# Patient Record
Sex: FEMALE | Race: WHITE | Employment: FULL TIME | URBAN - METROPOLITAN AREA
[De-identification: names, ages, dates, MRNs, and addresses within clinical notes are randomized per-mention and may not be internally consistent; named-entity substitution may affect disease eponyms.]

---

## 2017-01-26 ENCOUNTER — OFFICE VISIT (OUTPATIENT)
Dept: PRIMARY CARE CLINIC | Age: 27
End: 2017-01-26

## 2017-01-26 VITALS
WEIGHT: 159.4 LBS | TEMPERATURE: 98.2 F | HEART RATE: 78 BPM | RESPIRATION RATE: 14 BRPM | HEIGHT: 63 IN | SYSTOLIC BLOOD PRESSURE: 104 MMHG | DIASTOLIC BLOOD PRESSURE: 70 MMHG | BODY MASS INDEX: 28.24 KG/M2

## 2017-01-26 DIAGNOSIS — F90.1 ATTENTION-DEFICIT HYPERACTIVITY DISORDER, PREDOMINANTLY HYPERACTIVE TYPE: ICD-10-CM

## 2017-01-26 DIAGNOSIS — R05.9 COUGH: ICD-10-CM

## 2017-01-26 DIAGNOSIS — J06.9 ACUTE UPPER RESPIRATORY INFECTION: Primary | ICD-10-CM

## 2017-01-26 PROCEDURE — 99213 OFFICE O/P EST LOW 20 MIN: CPT | Performed by: FAMILY MEDICINE

## 2017-01-26 RX ORDER — DEXTROAMPHETAMINE SACCHARATE, AMPHETAMINE ASPARTATE MONOHYDRATE, DEXTROAMPHETAMINE SULFATE AND AMPHETAMINE SULFATE 5; 5; 5; 5 MG/1; MG/1; MG/1; MG/1
20 CAPSULE, EXTENDED RELEASE ORAL EVERY MORNING
Qty: 30 CAPSULE | Refills: 0 | Status: SHIPPED | OUTPATIENT
Start: 2017-03-26 | End: 2017-04-06 | Stop reason: SDUPTHER

## 2017-01-26 RX ORDER — GUAIFENESIN AND CODEINE PHOSPHATE 100; 10 MG/5ML; MG/5ML
SYRUP ORAL
Refills: 0 | COMMUNITY
Start: 2017-01-14 | End: 2017-02-23

## 2017-01-26 RX ORDER — DEXTROMETHORPHAN HYDROBROMIDE AND PROMETHAZINE HYDROCHLORIDE 15; 6.25 MG/5ML; MG/5ML
SYRUP ORAL
Refills: 0 | COMMUNITY
Start: 2017-01-02 | End: 2017-02-23

## 2017-01-26 RX ORDER — DEXTROAMPHETAMINE SACCHARATE, AMPHETAMINE ASPARTATE MONOHYDRATE, DEXTROAMPHETAMINE SULFATE AND AMPHETAMINE SULFATE 5; 5; 5; 5 MG/1; MG/1; MG/1; MG/1
20 CAPSULE, EXTENDED RELEASE ORAL EVERY MORNING
Qty: 30 CAPSULE | Refills: 0 | Status: SHIPPED | OUTPATIENT
Start: 2017-02-26 | End: 2017-02-23

## 2017-01-26 RX ORDER — DEXTROAMPHETAMINE SACCHARATE, AMPHETAMINE ASPARTATE MONOHYDRATE, DEXTROAMPHETAMINE SULFATE AND AMPHETAMINE SULFATE 5; 5; 5; 5 MG/1; MG/1; MG/1; MG/1
20 CAPSULE, EXTENDED RELEASE ORAL EVERY MORNING
Qty: 30 CAPSULE | Refills: 0 | Status: SHIPPED | OUTPATIENT
Start: 2017-01-26 | End: 2017-02-23

## 2017-01-26 ASSESSMENT — ENCOUNTER SYMPTOMS
ABDOMINAL PAIN: 0
SHORTNESS OF BREATH: 0

## 2017-02-23 ENCOUNTER — OFFICE VISIT (OUTPATIENT)
Dept: PRIMARY CARE CLINIC | Age: 27
End: 2017-02-23

## 2017-02-23 VITALS
HEIGHT: 63 IN | SYSTOLIC BLOOD PRESSURE: 110 MMHG | HEART RATE: 104 BPM | RESPIRATION RATE: 16 BRPM | WEIGHT: 159 LBS | BODY MASS INDEX: 28.17 KG/M2 | DIASTOLIC BLOOD PRESSURE: 72 MMHG | OXYGEN SATURATION: 98 % | TEMPERATURE: 97.1 F

## 2017-02-23 DIAGNOSIS — Z72.0 NICOTINE ABUSE: ICD-10-CM

## 2017-02-23 DIAGNOSIS — F90.0 ATTENTION DEFICIT HYPERACTIVITY DISORDER (ADHD), PREDOMINANTLY INATTENTIVE TYPE: Primary | ICD-10-CM

## 2017-02-23 PROCEDURE — 99214 OFFICE O/P EST MOD 30 MIN: CPT | Performed by: FAMILY MEDICINE

## 2017-02-23 RX ORDER — BUPROPION HYDROCHLORIDE 150 MG/1
150 TABLET, EXTENDED RELEASE ORAL 2 TIMES DAILY
Qty: 60 TABLET | Refills: 2 | Status: SHIPPED | OUTPATIENT
Start: 2017-02-23 | End: 2017-07-12

## 2017-03-04 ASSESSMENT — ENCOUNTER SYMPTOMS
SHORTNESS OF BREATH: 0
COUGH: 0

## 2017-03-28 DIAGNOSIS — F90.1 ATTENTION-DEFICIT HYPERACTIVITY DISORDER, PREDOMINANTLY HYPERACTIVE TYPE: ICD-10-CM

## 2017-03-28 RX ORDER — DEXTROAMPHETAMINE SACCHARATE, AMPHETAMINE ASPARTATE MONOHYDRATE, DEXTROAMPHETAMINE SULFATE AND AMPHETAMINE SULFATE 5; 5; 5; 5 MG/1; MG/1; MG/1; MG/1
20 CAPSULE, EXTENDED RELEASE ORAL EVERY MORNING
Qty: 30 CAPSULE | Refills: 0 | OUTPATIENT
Start: 2017-03-28

## 2017-04-06 ENCOUNTER — OFFICE VISIT (OUTPATIENT)
Dept: PRIMARY CARE CLINIC | Age: 27
End: 2017-04-06

## 2017-04-06 VITALS
WEIGHT: 160 LBS | TEMPERATURE: 98.5 F | SYSTOLIC BLOOD PRESSURE: 100 MMHG | HEART RATE: 91 BPM | RESPIRATION RATE: 14 BRPM | DIASTOLIC BLOOD PRESSURE: 80 MMHG | BODY MASS INDEX: 28.35 KG/M2 | HEIGHT: 63 IN

## 2017-04-06 DIAGNOSIS — Z72.0 NICOTINE ABUSE: ICD-10-CM

## 2017-04-06 DIAGNOSIS — S29.019A THORACIC MYOFASCIAL STRAIN, INITIAL ENCOUNTER: ICD-10-CM

## 2017-04-06 DIAGNOSIS — F90.0 ATTENTION DEFICIT HYPERACTIVITY DISORDER (ADHD), PREDOMINANTLY INATTENTIVE TYPE: Primary | ICD-10-CM

## 2017-04-06 DIAGNOSIS — F90.1 ATTENTION-DEFICIT HYPERACTIVITY DISORDER, PREDOMINANTLY HYPERACTIVE TYPE: ICD-10-CM

## 2017-04-06 PROCEDURE — 99213 OFFICE O/P EST LOW 20 MIN: CPT | Performed by: FAMILY MEDICINE

## 2017-04-06 RX ORDER — DEXTROAMPHETAMINE SACCHARATE, AMPHETAMINE ASPARTATE MONOHYDRATE, DEXTROAMPHETAMINE SULFATE AND AMPHETAMINE SULFATE 5; 5; 5; 5 MG/1; MG/1; MG/1; MG/1
20 CAPSULE, EXTENDED RELEASE ORAL EVERY MORNING
Qty: 30 CAPSULE | Refills: 0 | Status: SHIPPED | OUTPATIENT
Start: 2017-04-06 | End: 2017-05-26 | Stop reason: SDUPTHER

## 2017-04-06 RX ORDER — PREDNISONE 10 MG/1
TABLET ORAL
Qty: 20 TABLET | Refills: 0 | Status: SHIPPED | OUTPATIENT
Start: 2017-04-06 | End: 2017-04-20

## 2017-04-06 ASSESSMENT — ENCOUNTER SYMPTOMS: SHORTNESS OF BREATH: 0

## 2017-05-26 ENCOUNTER — OFFICE VISIT (OUTPATIENT)
Dept: PRIMARY CARE CLINIC | Age: 27
End: 2017-05-26

## 2017-05-26 VITALS
RESPIRATION RATE: 15 BRPM | SYSTOLIC BLOOD PRESSURE: 124 MMHG | TEMPERATURE: 98 F | DIASTOLIC BLOOD PRESSURE: 64 MMHG | BODY MASS INDEX: 28.35 KG/M2 | HEIGHT: 63 IN | WEIGHT: 160 LBS | HEART RATE: 85 BPM | OXYGEN SATURATION: 98 %

## 2017-05-26 DIAGNOSIS — F90.1 ATTENTION-DEFICIT HYPERACTIVITY DISORDER, PREDOMINANTLY HYPERACTIVE TYPE: ICD-10-CM

## 2017-05-26 PROCEDURE — 99213 OFFICE O/P EST LOW 20 MIN: CPT | Performed by: FAMILY MEDICINE

## 2017-05-26 RX ORDER — DEXTROAMPHETAMINE SACCHARATE, AMPHETAMINE ASPARTATE MONOHYDRATE, DEXTROAMPHETAMINE SULFATE AND AMPHETAMINE SULFATE 5; 5; 5; 5 MG/1; MG/1; MG/1; MG/1
20 CAPSULE, EXTENDED RELEASE ORAL EVERY MORNING
Qty: 30 CAPSULE | Refills: 0 | Status: SHIPPED | OUTPATIENT
Start: 2017-05-26 | End: 2017-05-26 | Stop reason: SDUPTHER

## 2017-05-26 RX ORDER — DEXTROAMPHETAMINE SACCHARATE, AMPHETAMINE ASPARTATE MONOHYDRATE, DEXTROAMPHETAMINE SULFATE AND AMPHETAMINE SULFATE 5; 5; 5; 5 MG/1; MG/1; MG/1; MG/1
20 CAPSULE, EXTENDED RELEASE ORAL EVERY MORNING
Qty: 30 CAPSULE | Refills: 0 | Status: SHIPPED | OUTPATIENT
Start: 2017-06-26 | End: 2017-09-06 | Stop reason: SDUPTHER

## 2017-05-26 RX ORDER — DEXTROAMPHETAMINE SACCHARATE, AMPHETAMINE ASPARTATE MONOHYDRATE, DEXTROAMPHETAMINE SULFATE AND AMPHETAMINE SULFATE 5; 5; 5; 5 MG/1; MG/1; MG/1; MG/1
20 CAPSULE, EXTENDED RELEASE ORAL EVERY MORNING
Qty: 30 CAPSULE | Refills: 0 | Status: SHIPPED | OUTPATIENT
Start: 2017-07-26 | End: 2017-07-12 | Stop reason: SDUPTHER

## 2017-05-26 ASSESSMENT — ENCOUNTER SYMPTOMS
ABDOMINAL PAIN: 0
SHORTNESS OF BREATH: 0
SORE THROAT: 0
DIARRHEA: 0
CONSTIPATION: 0
RESPIRATORY NEGATIVE: 1
NAUSEA: 0
VOMITING: 0
BACK PAIN: 0
COUGH: 0
WHEEZING: 0
SINUS PRESSURE: 0

## 2017-07-12 ENCOUNTER — OFFICE VISIT (OUTPATIENT)
Dept: OBGYN | Age: 27
End: 2017-07-12

## 2017-07-12 VITALS
HEIGHT: 63 IN | DIASTOLIC BLOOD PRESSURE: 72 MMHG | BODY MASS INDEX: 27.64 KG/M2 | WEIGHT: 156 LBS | SYSTOLIC BLOOD PRESSURE: 126 MMHG

## 2017-07-12 DIAGNOSIS — N92.1 MENORRHAGIA WITH IRREGULAR CYCLE: Primary | ICD-10-CM

## 2017-07-12 PROCEDURE — 99202 OFFICE O/P NEW SF 15 MIN: CPT | Performed by: OBSTETRICS & GYNECOLOGY

## 2017-07-12 RX ORDER — IBUPROFEN 800 MG/1
800 TABLET ORAL EVERY 6 HOURS PRN
COMMUNITY
End: 2017-09-06 | Stop reason: ALTCHOICE

## 2017-07-12 ASSESSMENT — ENCOUNTER SYMPTOMS
CONSTIPATION: 0
VOMITING: 0
ABDOMINAL DISTENTION: 0
EYES NEGATIVE: 1
ALLERGIC/IMMUNOLOGIC NEGATIVE: 1
NAUSEA: 0
BLOOD IN STOOL: 0
ANAL BLEEDING: 0
RECTAL PAIN: 0
DIARRHEA: 0
RESPIRATORY NEGATIVE: 1
ABDOMINAL PAIN: 0

## 2017-07-13 ENCOUNTER — HOSPITAL ENCOUNTER (OUTPATIENT)
Dept: ULTRASOUND IMAGING | Age: 27
Discharge: HOME | End: 2017-07-13
Payer: COMMERCIAL

## 2017-07-13 DIAGNOSIS — N92.1 MENORRHAGIA WITH IRREGULAR CYCLE: ICD-10-CM

## 2017-07-13 PROCEDURE — 76856 US EXAM PELVIC COMPLETE: CPT

## 2017-07-13 PROCEDURE — 76830 TRANSVAGINAL US NON-OB: CPT

## 2017-08-09 ENCOUNTER — OFFICE VISIT (OUTPATIENT)
Dept: OBGYN | Age: 27
End: 2017-08-09

## 2017-08-09 VITALS
WEIGHT: 154 LBS | DIASTOLIC BLOOD PRESSURE: 70 MMHG | HEIGHT: 63 IN | SYSTOLIC BLOOD PRESSURE: 110 MMHG | BODY MASS INDEX: 27.29 KG/M2

## 2017-08-09 DIAGNOSIS — Z11.3 ROUTINE SCREENING FOR STI (SEXUALLY TRANSMITTED INFECTION): ICD-10-CM

## 2017-08-09 DIAGNOSIS — Z11.51 SPECIAL SCREENING EXAMINATION FOR HUMAN PAPILLOMAVIRUS (HPV): ICD-10-CM

## 2017-08-09 DIAGNOSIS — Z01.419 WOMEN'S ANNUAL ROUTINE GYNECOLOGICAL EXAMINATION: Primary | ICD-10-CM

## 2017-08-09 DIAGNOSIS — N92.1 MENORRHAGIA WITH IRREGULAR CYCLE: ICD-10-CM

## 2017-08-09 LAB
BASOPHILS ABSOLUTE: 0.1 K/UL (ref 0–0.2)
BASOPHILS RELATIVE PERCENT: 1.3 %
EOSINOPHILS ABSOLUTE: 0.1 K/UL (ref 0–0.7)
EOSINOPHILS RELATIVE PERCENT: 1.9 %
FOLLICLE STIMULATING HORMONE: 7.5 MIU/ML
GONADOTROPIN, CHORIONIC (HCG) QUANT: <0.1 MIU/ML
HCT VFR BLD CALC: 45.7 % (ref 37–47)
HEMOGLOBIN: 15.3 G/DL (ref 12–16)
LYMPHOCYTES ABSOLUTE: 1.6 K/UL (ref 1–4.8)
LYMPHOCYTES RELATIVE PERCENT: 26.9 %
MCH RBC QN AUTO: 31.3 PG (ref 27–31.3)
MCHC RBC AUTO-ENTMCNC: 33.5 % (ref 33–37)
MCV RBC AUTO: 93.6 FL (ref 82–100)
MONOCYTES ABSOLUTE: 0.6 K/UL (ref 0.2–0.8)
MONOCYTES RELATIVE PERCENT: 9.2 %
NEUTROPHILS ABSOLUTE: 3.7 K/UL (ref 1.4–6.5)
NEUTROPHILS RELATIVE PERCENT: 60.7 %
PDW BLD-RTO: 12.9 % (ref 11.5–14.5)
PLATELET # BLD: 245 K/UL (ref 130–400)
RBC # BLD: 4.88 M/UL (ref 4.2–5.4)
T4 FREE: 1.14 NG/DL (ref 0.93–1.7)
TSH SERPL DL<=0.05 MIU/L-ACNC: 0.6 UIU/ML (ref 0.27–4.2)
WBC # BLD: 6 K/UL (ref 4.8–10.8)

## 2017-08-09 PROCEDURE — 99395 PREV VISIT EST AGE 18-39: CPT | Performed by: OBSTETRICS & GYNECOLOGY

## 2017-08-09 RX ORDER — GENTAMICIN SULFATE 3 MG/ML
SOLUTION/ DROPS OPHTHALMIC
Refills: 0 | COMMUNITY
Start: 2017-08-07 | End: 2017-08-23 | Stop reason: ALTCHOICE

## 2017-08-09 ASSESSMENT — ENCOUNTER SYMPTOMS
BLOOD IN STOOL: 0
DIARRHEA: 0
CONSTIPATION: 0
NAUSEA: 0
RESPIRATORY NEGATIVE: 1
VOMITING: 0
ABDOMINAL PAIN: 0
ANAL BLEEDING: 0
ABDOMINAL DISTENTION: 0
EYES NEGATIVE: 1
ALLERGIC/IMMUNOLOGIC NEGATIVE: 1
RECTAL PAIN: 0

## 2017-08-16 DIAGNOSIS — Z11.3 ROUTINE SCREENING FOR STI (SEXUALLY TRANSMITTED INFECTION): ICD-10-CM

## 2017-08-16 DIAGNOSIS — Z11.51 SPECIAL SCREENING EXAMINATION FOR HUMAN PAPILLOMAVIRUS (HPV): ICD-10-CM

## 2017-08-16 DIAGNOSIS — Z01.419 WOMEN'S ANNUAL ROUTINE GYNECOLOGICAL EXAMINATION: ICD-10-CM

## 2017-08-21 ENCOUNTER — TELEPHONE (OUTPATIENT)
Dept: OBGYN | Age: 27
End: 2017-08-21

## 2017-08-21 RX ORDER — METRONIDAZOLE 500 MG/1
500 TABLET ORAL 2 TIMES DAILY
Qty: 14 TABLET | Refills: 0 | OUTPATIENT
Start: 2017-08-21 | End: 2017-08-28

## 2017-08-23 ENCOUNTER — OFFICE VISIT (OUTPATIENT)
Dept: PRIMARY CARE CLINIC | Age: 27
End: 2017-08-23

## 2017-08-23 VITALS
SYSTOLIC BLOOD PRESSURE: 116 MMHG | DIASTOLIC BLOOD PRESSURE: 86 MMHG | OXYGEN SATURATION: 93 % | HEART RATE: 83 BPM | WEIGHT: 156.4 LBS | HEIGHT: 63 IN | RESPIRATION RATE: 16 BRPM | BODY MASS INDEX: 27.71 KG/M2 | TEMPERATURE: 97.6 F

## 2017-08-23 DIAGNOSIS — R05.9 COUGH: Primary | ICD-10-CM

## 2017-08-23 DIAGNOSIS — Z20.5 EXPOSURE TO HEPATITIS C: ICD-10-CM

## 2017-08-23 DIAGNOSIS — J06.9 ACUTE UPPER RESPIRATORY INFECTION: ICD-10-CM

## 2017-08-23 DIAGNOSIS — F90.0 ATTENTION DEFICIT HYPERACTIVITY DISORDER (ADHD), PREDOMINANTLY INATTENTIVE TYPE: ICD-10-CM

## 2017-08-23 PROCEDURE — 99214 OFFICE O/P EST MOD 30 MIN: CPT | Performed by: FAMILY MEDICINE

## 2017-08-23 PROCEDURE — 96372 THER/PROPH/DIAG INJ SC/IM: CPT | Performed by: FAMILY MEDICINE

## 2017-08-23 RX ORDER — DEXTROAMPHETAMINE SACCHARATE, AMPHETAMINE ASPARTATE MONOHYDRATE, DEXTROAMPHETAMINE SULFATE AND AMPHETAMINE SULFATE 5; 5; 5; 5 MG/1; MG/1; MG/1; MG/1
20 CAPSULE, EXTENDED RELEASE ORAL EVERY MORNING
Qty: 30 CAPSULE | Refills: 0 | Status: SHIPPED | OUTPATIENT
Start: 2017-10-21 | End: 2017-11-22

## 2017-08-23 RX ORDER — DEXTROAMPHETAMINE SACCHARATE, AMPHETAMINE ASPARTATE MONOHYDRATE, DEXTROAMPHETAMINE SULFATE AND AMPHETAMINE SULFATE 5; 5; 5; 5 MG/1; MG/1; MG/1; MG/1
20 CAPSULE, EXTENDED RELEASE ORAL EVERY MORNING
Qty: 30 CAPSULE | Refills: 0 | Status: SHIPPED | OUTPATIENT
Start: 2017-09-22 | End: 2017-11-22

## 2017-08-23 RX ORDER — CEFTRIAXONE 1 G/1
1 INJECTION, POWDER, FOR SOLUTION INTRAMUSCULAR; INTRAVENOUS ONCE
Status: COMPLETED | OUTPATIENT
Start: 2017-08-23 | End: 2017-08-23

## 2017-08-23 RX ORDER — BENZONATATE 100 MG/1
100-200 CAPSULE ORAL 3 TIMES DAILY PRN
Qty: 60 CAPSULE | Refills: 1 | Status: SHIPPED | OUTPATIENT
Start: 2017-08-23 | End: 2017-09-16 | Stop reason: DRUGHIGH

## 2017-08-23 RX ORDER — DEXTROAMPHETAMINE SACCHARATE, AMPHETAMINE ASPARTATE MONOHYDRATE, DEXTROAMPHETAMINE SULFATE AND AMPHETAMINE SULFATE 5; 5; 5; 5 MG/1; MG/1; MG/1; MG/1
20 CAPSULE, EXTENDED RELEASE ORAL EVERY MORNING
Qty: 30 CAPSULE | Refills: 0 | Status: SHIPPED | OUTPATIENT
Start: 2017-08-23 | End: 2017-09-06 | Stop reason: SDUPTHER

## 2017-08-23 RX ORDER — ALBUTEROL SULFATE 90 UG/1
2 AEROSOL, METERED RESPIRATORY (INHALATION) EVERY 6 HOURS PRN
Qty: 1 INHALER | Refills: 1 | Status: SHIPPED | OUTPATIENT
Start: 2017-08-23 | End: 2018-02-20

## 2017-08-23 RX ORDER — CEFUROXIME AXETIL 500 MG/1
TABLET ORAL
Qty: 20 TABLET | Refills: 0 | Status: SHIPPED | OUTPATIENT
Start: 2017-08-23 | End: 2017-09-02

## 2017-08-23 RX ORDER — PREDNISONE 10 MG/1
TABLET ORAL
Qty: 20 TABLET | Refills: 0 | Status: SHIPPED | OUTPATIENT
Start: 2017-08-23 | End: 2017-09-06 | Stop reason: ALTCHOICE

## 2017-08-23 RX ADMIN — CEFTRIAXONE 1 G: 1 INJECTION, POWDER, FOR SOLUTION INTRAMUSCULAR; INTRAVENOUS at 17:29

## 2017-08-23 ASSESSMENT — ENCOUNTER SYMPTOMS
COUGH: 0
ABDOMINAL PAIN: 0
CHEST TIGHTNESS: 0
SHORTNESS OF BREATH: 0

## 2017-08-25 ENCOUNTER — TELEPHONE (OUTPATIENT)
Dept: PRIMARY CARE CLINIC | Age: 27
End: 2017-08-25

## 2017-08-25 DIAGNOSIS — F90.0 ATTENTION DEFICIT HYPERACTIVITY DISORDER (ADHD), PREDOMINANTLY INATTENTIVE TYPE: Primary | ICD-10-CM

## 2017-08-29 ENCOUNTER — OFFICE VISIT (OUTPATIENT)
Dept: OBGYN | Age: 27
End: 2017-08-29

## 2017-08-29 VITALS
BODY MASS INDEX: 28 KG/M2 | SYSTOLIC BLOOD PRESSURE: 114 MMHG | DIASTOLIC BLOOD PRESSURE: 84 MMHG | HEIGHT: 63 IN | WEIGHT: 158 LBS

## 2017-08-29 DIAGNOSIS — R87.610 ASCUS WITH POSITIVE HIGH RISK HPV CERVICAL: Primary | ICD-10-CM

## 2017-08-29 DIAGNOSIS — R87.810 ASCUS WITH POSITIVE HIGH RISK HPV CERVICAL: Primary | ICD-10-CM

## 2017-08-29 PROCEDURE — 99213 OFFICE O/P EST LOW 20 MIN: CPT | Performed by: OBSTETRICS & GYNECOLOGY

## 2017-08-29 ASSESSMENT — ENCOUNTER SYMPTOMS
DIARRHEA: 0
RESPIRATORY NEGATIVE: 1
BLOOD IN STOOL: 0
RECTAL PAIN: 0
EYES NEGATIVE: 1
ALLERGIC/IMMUNOLOGIC NEGATIVE: 1
VOMITING: 0
CONSTIPATION: 0
ABDOMINAL DISTENTION: 0
ABDOMINAL PAIN: 0
ANAL BLEEDING: 0
NAUSEA: 0

## 2017-09-06 ENCOUNTER — OFFICE VISIT (OUTPATIENT)
Dept: FAMILY MEDICINE CLINIC | Age: 27
End: 2017-09-06

## 2017-09-06 VITALS
SYSTOLIC BLOOD PRESSURE: 110 MMHG | TEMPERATURE: 98.9 F | HEART RATE: 94 BPM | BODY MASS INDEX: 27.88 KG/M2 | WEIGHT: 157.4 LBS | OXYGEN SATURATION: 98 % | RESPIRATION RATE: 16 BRPM | DIASTOLIC BLOOD PRESSURE: 70 MMHG

## 2017-09-06 DIAGNOSIS — R05.9 COUGH: ICD-10-CM

## 2017-09-06 DIAGNOSIS — J20.9 BRONCHITIS WITH BRONCHOSPASM: Primary | ICD-10-CM

## 2017-09-06 DIAGNOSIS — R09.82 POST-NASAL DRIP: ICD-10-CM

## 2017-09-06 DIAGNOSIS — R06.2 WHEEZING: ICD-10-CM

## 2017-09-06 DIAGNOSIS — R06.02 SHORTNESS OF BREATH: ICD-10-CM

## 2017-09-06 PROCEDURE — 94010 BREATHING CAPACITY TEST: CPT | Performed by: NURSE PRACTITIONER

## 2017-09-06 PROCEDURE — 99213 OFFICE O/P EST LOW 20 MIN: CPT | Performed by: NURSE PRACTITIONER

## 2017-09-06 RX ORDER — LEVOCETIRIZINE DIHYDROCHLORIDE 5 MG/1
5 TABLET, FILM COATED ORAL NIGHTLY
Qty: 30 TABLET | Refills: 5 | Status: SHIPPED | OUTPATIENT
Start: 2017-09-06 | End: 2017-11-22

## 2017-09-06 RX ORDER — PREDNISONE 10 MG/1
TABLET ORAL
Qty: 45 TABLET | Refills: 0 | Status: SHIPPED | OUTPATIENT
Start: 2017-09-06 | End: 2017-09-16 | Stop reason: ALTCHOICE

## 2017-09-06 RX ORDER — FLUTICASONE PROPIONATE 50 MCG
2 SPRAY, SUSPENSION (ML) NASAL DAILY
Qty: 1 BOTTLE | Refills: 0 | Status: SHIPPED | OUTPATIENT
Start: 2017-09-06 | End: 2017-11-22

## 2017-09-06 RX ORDER — AZITHROMYCIN 250 MG/1
TABLET, FILM COATED ORAL
Qty: 1 PACKET | Refills: 0 | Status: SHIPPED | OUTPATIENT
Start: 2017-09-06 | End: 2017-09-16 | Stop reason: ALTCHOICE

## 2017-09-06 ASSESSMENT — ENCOUNTER SYMPTOMS
DIARRHEA: 0
SWOLLEN GLANDS: 0
RHINORRHEA: 1
NAUSEA: 0
WHEEZING: 1
COUGH: 1
ABDOMINAL PAIN: 0
VOMITING: 0
SINUS PAIN: 0
SORE THROAT: 0

## 2017-09-13 ENCOUNTER — TELEPHONE (OUTPATIENT)
Dept: OBGYN | Age: 27
End: 2017-09-13

## 2017-09-16 ENCOUNTER — OFFICE VISIT (OUTPATIENT)
Dept: FAMILY MEDICINE CLINIC | Age: 27
End: 2017-09-16

## 2017-09-16 VITALS
DIASTOLIC BLOOD PRESSURE: 68 MMHG | SYSTOLIC BLOOD PRESSURE: 110 MMHG | HEIGHT: 63 IN | BODY MASS INDEX: 27.36 KG/M2 | WEIGHT: 154.4 LBS | OXYGEN SATURATION: 98 % | TEMPERATURE: 98.4 F | HEART RATE: 94 BPM

## 2017-09-16 DIAGNOSIS — M94.0 COSTOCHONDRITIS, ACUTE: Primary | ICD-10-CM

## 2017-09-16 DIAGNOSIS — R05.9 COUGH: ICD-10-CM

## 2017-09-16 DIAGNOSIS — R07.89 LEFT-SIDED CHEST WALL PAIN: ICD-10-CM

## 2017-09-16 DIAGNOSIS — R09.82 POST-NASAL DRIP: ICD-10-CM

## 2017-09-16 PROCEDURE — 99213 OFFICE O/P EST LOW 20 MIN: CPT | Performed by: NURSE PRACTITIONER

## 2017-09-16 RX ORDER — MELOXICAM 15 MG/1
15 TABLET ORAL DAILY
Qty: 30 TABLET | Refills: 0 | Status: SHIPPED | OUTPATIENT
Start: 2017-09-16 | End: 2017-11-22

## 2017-09-16 RX ORDER — BENZONATATE 200 MG/1
200 CAPSULE ORAL 3 TIMES DAILY PRN
Qty: 60 CAPSULE | Refills: 0 | Status: SHIPPED | OUTPATIENT
Start: 2017-09-16 | End: 2018-02-20

## 2017-09-21 ASSESSMENT — ENCOUNTER SYMPTOMS
SHORTNESS OF BREATH: 1
HEARTBURN: 0
HEMOPTYSIS: 0
SORE THROAT: 0
RHINORRHEA: 0
COUGH: 1
WHEEZING: 1

## 2017-09-25 ENCOUNTER — PROCEDURE VISIT (OUTPATIENT)
Dept: OBGYN | Age: 27
End: 2017-09-25

## 2017-09-25 VITALS
BODY MASS INDEX: 28.35 KG/M2 | DIASTOLIC BLOOD PRESSURE: 78 MMHG | HEIGHT: 63 IN | WEIGHT: 160 LBS | SYSTOLIC BLOOD PRESSURE: 112 MMHG

## 2017-09-25 DIAGNOSIS — R87.810 ASCUS WITH POSITIVE HIGH RISK HPV CERVICAL: Primary | ICD-10-CM

## 2017-09-25 DIAGNOSIS — R87.610 ASCUS WITH POSITIVE HIGH RISK HPV CERVICAL: Primary | ICD-10-CM

## 2017-09-25 LAB
CONTROL: NORMAL
PREGNANCY TEST URINE, POC: NORMAL

## 2017-09-25 PROCEDURE — 81025 URINE PREGNANCY TEST: CPT | Performed by: OBSTETRICS & GYNECOLOGY

## 2017-09-25 PROCEDURE — 57454 BX/CURETT OF CERVIX W/SCOPE: CPT | Performed by: OBSTETRICS & GYNECOLOGY

## 2017-09-25 ASSESSMENT — ENCOUNTER SYMPTOMS
ALLERGIC/IMMUNOLOGIC NEGATIVE: 1
CONSTIPATION: 0
ABDOMINAL PAIN: 0
RESPIRATORY NEGATIVE: 1
NAUSEA: 0
EYES NEGATIVE: 1
BLOOD IN STOOL: 0
VOMITING: 0
ANAL BLEEDING: 0
RECTAL PAIN: 0
ABDOMINAL DISTENTION: 0
DIARRHEA: 0

## 2017-10-02 DIAGNOSIS — R87.810 ASCUS WITH POSITIVE HIGH RISK HPV CERVICAL: ICD-10-CM

## 2017-10-02 DIAGNOSIS — R87.610 ASCUS WITH POSITIVE HIGH RISK HPV CERVICAL: ICD-10-CM

## 2017-10-31 ENCOUNTER — TELEPHONE (OUTPATIENT)
Dept: OBGYN | Age: 27
End: 2017-10-31

## 2017-11-20 ENCOUNTER — OFFICE VISIT (OUTPATIENT)
Dept: OBGYN | Age: 27
End: 2017-11-20

## 2017-11-20 VITALS
WEIGHT: 159 LBS | SYSTOLIC BLOOD PRESSURE: 110 MMHG | HEIGHT: 63 IN | DIASTOLIC BLOOD PRESSURE: 62 MMHG | BODY MASS INDEX: 28.17 KG/M2

## 2017-11-20 DIAGNOSIS — R87.610 ASCUS WITH POSITIVE HIGH RISK HPV CERVICAL: ICD-10-CM

## 2017-11-20 DIAGNOSIS — Z09 FOLLOW UP: Primary | ICD-10-CM

## 2017-11-20 DIAGNOSIS — R87.613 HGSIL (HIGH GRADE SQUAMOUS INTRAEPITHELIAL LESION) ON PAP SMEAR OF CERVIX: ICD-10-CM

## 2017-11-20 DIAGNOSIS — R87.810 ASCUS WITH POSITIVE HIGH RISK HPV CERVICAL: ICD-10-CM

## 2017-11-20 PROCEDURE — 99213 OFFICE O/P EST LOW 20 MIN: CPT | Performed by: OBSTETRICS & GYNECOLOGY

## 2017-11-20 PROCEDURE — G8484 FLU IMMUNIZE NO ADMIN: HCPCS | Performed by: OBSTETRICS & GYNECOLOGY

## 2017-11-20 PROCEDURE — 4004F PT TOBACCO SCREEN RCVD TLK: CPT | Performed by: OBSTETRICS & GYNECOLOGY

## 2017-11-20 PROCEDURE — G8417 CALC BMI ABV UP PARAM F/U: HCPCS | Performed by: OBSTETRICS & GYNECOLOGY

## 2017-11-20 PROCEDURE — G8427 DOCREV CUR MEDS BY ELIG CLIN: HCPCS | Performed by: OBSTETRICS & GYNECOLOGY

## 2017-11-20 ASSESSMENT — ENCOUNTER SYMPTOMS
ABDOMINAL PAIN: 0
CONSTIPATION: 0
ANAL BLEEDING: 0
VOMITING: 0
NAUSEA: 0
RECTAL PAIN: 0
EYES NEGATIVE: 1
DIARRHEA: 0
RESPIRATORY NEGATIVE: 1
BLOOD IN STOOL: 0
ALLERGIC/IMMUNOLOGIC NEGATIVE: 1
ABDOMINAL DISTENTION: 0

## 2017-11-21 NOTE — PROGRESS NOTES
Patient here to discuss colpo results. Pap ASCUS, but cervical bx c/w HGSIL / NOEMI II. Discussed high grade dysplasia and need for LEEP procedure. Discussed LEEP with risks and benefits. Offered LEEP in office or OR. Patient states she would like to proceed in OR secondary to anxiety. Leep scheduled and F/U pre-op. Pt was seen with total face to face time of 15 minutes with more than 50% of the visit being counseling and education regarding encounter dx of HGSIL. See discussion /counseling details as stated above. Vitals:  /62   Ht 5' 3\" (1.6 m)   Wt 159 lb (72.1 kg)   BMI 28.17 kg/m²   Past Medical History:   Diagnosis Date    ADHD (attention deficit hyperactivity disorder)     Anxiety      No past surgical history on file. Allergies:  Tramadol  Current Outpatient Prescriptions   Medication Sig Dispense Refill    meloxicam (MOBIC) 15 MG tablet Take 1 tablet by mouth daily 30 tablet 0    benzonatate (TESSALON) 200 MG capsule Take 1 capsule by mouth 3 times daily as needed for Cough 60 capsule 0    levocetirizine (XYZAL) 5 MG tablet Take 1 tablet by mouth nightly 30 tablet 5    fluticasone (FLONASE) 50 MCG/ACT nasal spray 2 sprays by Nasal route daily 1 Bottle 0    amphetamine-dextroamphetamine (ADDERALL XR) 20 MG extended release capsule Take 1 capsule by mouth every morning . Earliest Fill Date: 9/22/17 30 capsule 0    amphetamine-dextroamphetamine (ADDERALL XR) 20 MG extended release capsule Take 1 capsule by mouth every morning . Earliest Fill Date: 10/21/17 30 capsule 0    albuterol sulfate HFA (PROAIR HFA) 108 (90 Base) MCG/ACT inhaler Inhale 2 puffs into the lungs every 6 hours as needed for Wheezing 1 Inhaler 1    etonogestrel (NEXPLANON) 68 MG implant Inject 68 mg into the skin once       No current facility-administered medications for this visit.       Social History     Social History    Marital status: Single     Spouse name: N/A    Number of children: N/A    Years of education: N/A     Occupational History    Not on file. Social History Main Topics    Smoking status: Current Every Day Smoker     Packs/day: 0.25     Types: Cigarettes    Smokeless tobacco: Never Used    Alcohol use Not on file      Comment: occ    Drug use: No    Sexual activity: Yes     Partners: Male     Other Topics Concern    Not on file     Social History Narrative    No narrative on file     Family History   Problem Relation Age of Onset    Cancer Maternal Aunt     Cancer Paternal Uncle     Cancer Maternal Grandfather     Cancer Paternal Grandfather     No Known Problems Paternal Grandmother     No Known Problems Maternal Grandmother     No Known Problems Father     No Known Problems Mother     No Known Problems Brother     No Known Problems Sister     No Known Problems Other     Breast Cancer Neg Hx     Colon Cancer Neg Hx     Diabetes Neg Hx     Eclampsia Neg Hx     Hypertension Neg Hx     Ovarian Cancer Neg Hx      Labor Neg Hx     Spont Abortions Neg Hx     Stroke Neg Hx        Review of Systems   Constitutional: Negative. Negative for activity change, appetite change, chills, diaphoresis, fatigue, fever and unexpected weight change. HENT: Negative. Eyes: Negative. Respiratory: Negative. Cardiovascular: Negative. Gastrointestinal: Negative for abdominal distention, abdominal pain, anal bleeding, blood in stool, constipation, diarrhea, nausea, rectal pain and vomiting. Endocrine: Negative. Genitourinary: Negative for decreased urine volume, difficulty urinating, dyspareunia, dysuria, enuresis, flank pain, frequency, genital sores, hematuria, menstrual problem, pelvic pain, urgency, vaginal bleeding, vaginal discharge and vaginal pain. Musculoskeletal: Negative. Skin: Negative. Allergic/Immunologic: Negative. Neurological: Negative. Hematological: Negative. Psychiatric/Behavioral: Negative.         Objective:     Physical Exam Constitutional: She is oriented to person, place, and time. She appears well-developed and well-nourished. No distress. HENT:   Head: Normocephalic and atraumatic. Eyes: Conjunctivae are normal.   Neck: Normal range of motion. Neck supple. Cardiovascular: Normal rate and regular rhythm. Pulmonary/Chest: Effort normal. No respiratory distress. Musculoskeletal: Normal range of motion. She exhibits no edema, tenderness or deformity. Neurological: She is alert and oriented to person, place, and time. She exhibits normal muscle tone. Coordination normal.   Skin: Skin is warm and dry. She is not diaphoretic. No pallor. Psychiatric: She has a normal mood and affect. Her behavior is normal. Judgment and thought content normal.       Assessment:      1. Follow up     2. ASCUS with positive high risk HPV cervical     3. HGSIL (high grade squamous intraepithelial lesion) on Pap smear of cervix           Plan:      Medications placed this encounter:  No orders of the defined types were placed in this encounter. Orders placed this encounter:  No orders of the defined types were placed in this encounter. Follow up:  Return for Pre Op.

## 2017-11-22 ENCOUNTER — OFFICE VISIT (OUTPATIENT)
Dept: PRIMARY CARE CLINIC | Age: 27
End: 2017-11-22

## 2017-11-22 VITALS
BODY MASS INDEX: 28.35 KG/M2 | SYSTOLIC BLOOD PRESSURE: 106 MMHG | TEMPERATURE: 98.3 F | WEIGHT: 160 LBS | HEART RATE: 80 BPM | DIASTOLIC BLOOD PRESSURE: 64 MMHG | HEIGHT: 63 IN | RESPIRATION RATE: 14 BRPM

## 2017-11-22 DIAGNOSIS — Z72.0 NICOTINE ABUSE: ICD-10-CM

## 2017-11-22 DIAGNOSIS — F90.0 ATTENTION DEFICIT HYPERACTIVITY DISORDER (ADHD), PREDOMINANTLY INATTENTIVE TYPE: Primary | ICD-10-CM

## 2017-11-22 PROCEDURE — G8417 CALC BMI ABV UP PARAM F/U: HCPCS | Performed by: FAMILY MEDICINE

## 2017-11-22 PROCEDURE — G8484 FLU IMMUNIZE NO ADMIN: HCPCS | Performed by: FAMILY MEDICINE

## 2017-11-22 PROCEDURE — 99214 OFFICE O/P EST MOD 30 MIN: CPT | Performed by: FAMILY MEDICINE

## 2017-11-22 PROCEDURE — 4004F PT TOBACCO SCREEN RCVD TLK: CPT | Performed by: FAMILY MEDICINE

## 2017-11-22 PROCEDURE — G8427 DOCREV CUR MEDS BY ELIG CLIN: HCPCS | Performed by: FAMILY MEDICINE

## 2017-11-22 RX ORDER — DEXTROAMPHETAMINE SACCHARATE, AMPHETAMINE ASPARTATE MONOHYDRATE, DEXTROAMPHETAMINE SULFATE AND AMPHETAMINE SULFATE 5; 5; 5; 5 MG/1; MG/1; MG/1; MG/1
20 CAPSULE, EXTENDED RELEASE ORAL EVERY MORNING
Qty: 30 CAPSULE | Refills: 0 | Status: SHIPPED | OUTPATIENT
Start: 2017-12-22 | End: 2018-02-20

## 2017-11-22 RX ORDER — DEXTROAMPHETAMINE SACCHARATE, AMPHETAMINE ASPARTATE MONOHYDRATE, DEXTROAMPHETAMINE SULFATE AND AMPHETAMINE SULFATE 5; 5; 5; 5 MG/1; MG/1; MG/1; MG/1
20 CAPSULE, EXTENDED RELEASE ORAL EVERY MORNING
Qty: 30 CAPSULE | Refills: 0 | Status: CANCELLED | OUTPATIENT
Start: 2017-11-22

## 2017-11-22 RX ORDER — DEXTROAMPHETAMINE SACCHARATE, AMPHETAMINE ASPARTATE MONOHYDRATE, DEXTROAMPHETAMINE SULFATE AND AMPHETAMINE SULFATE 5; 5; 5; 5 MG/1; MG/1; MG/1; MG/1
20 CAPSULE, EXTENDED RELEASE ORAL EVERY MORNING
Qty: 30 CAPSULE | Refills: 0 | Status: ON HOLD | OUTPATIENT
Start: 2017-11-22 | End: 2017-12-20 | Stop reason: SDUPTHER

## 2017-11-22 RX ORDER — DEXTROAMPHETAMINE SACCHARATE, AMPHETAMINE ASPARTATE MONOHYDRATE, DEXTROAMPHETAMINE SULFATE AND AMPHETAMINE SULFATE 5; 5; 5; 5 MG/1; MG/1; MG/1; MG/1
20 CAPSULE, EXTENDED RELEASE ORAL EVERY MORNING
Qty: 30 CAPSULE | Refills: 0 | Status: ON HOLD | OUTPATIENT
Start: 2018-01-22 | End: 2017-12-20 | Stop reason: SDUPTHER

## 2017-11-22 ASSESSMENT — ENCOUNTER SYMPTOMS
ABDOMINAL PAIN: 0
BACK PAIN: 0
CONSTIPATION: 0
WHEEZING: 0
RESPIRATORY NEGATIVE: 1
DIARRHEA: 0
SORE THROAT: 0
SINUS PRESSURE: 0
COUGH: 0
VOMITING: 0
SHORTNESS OF BREATH: 0
NAUSEA: 0

## 2017-11-22 NOTE — PROGRESS NOTES
Subjective:      Patient ID: Madelyn Rm is a 28 y.o. female who presents today for:  Chief Complaint   Patient presents with    ADHD     Pt. is here for a f/u on ADD. Pt. is currently taking Adderall which she states is working well for her. Pt. denies any issues today. HPI  ADD/ADHD:  Current treatment: Adderall XR- 20 mg, which has been effective. Residual symptoms: inattention, behavior problems, anxiety. Medication side effects: None. Patient denies anorexia, nausea, vomiting, abdominal pain, involuntary weight loss, palpitations, irritability and headache. Past Medical History:   Diagnosis Date    ADHD (attention deficit hyperactivity disorder)     Anxiety      No past surgical history on file. Family History   Problem Relation Age of Onset    Cancer Maternal Aunt     Cancer Paternal Uncle     Cancer Maternal Grandfather     Cancer Paternal Grandfather     No Known Problems Paternal Grandmother     No Known Problems Maternal Grandmother     No Known Problems Father     No Known Problems Mother     No Known Problems Brother     No Known Problems Sister     No Known Problems Other     Breast Cancer Neg Hx     Colon Cancer Neg Hx     Diabetes Neg Hx     Eclampsia Neg Hx     Hypertension Neg Hx     Ovarian Cancer Neg Hx      Labor Neg Hx     Spont Abortions Neg Hx     Stroke Neg Hx      Social History     Social History    Marital status: Single     Spouse name: N/A    Number of children: N/A    Years of education: N/A     Occupational History    Not on file.      Social History Main Topics    Smoking status: Current Every Day Smoker     Packs/day: 0.25     Types: Cigarettes    Smokeless tobacco: Never Used    Alcohol use Not on file      Comment: occ    Drug use: No    Sexual activity: Yes     Partners: Male     Other Topics Concern    Not on file     Social History Narrative    No narrative on file     Allergies:  Shrimp (diagnostic) and Tramadol    Review of Systems   Constitutional: Negative for chills, fatigue and fever. HENT: Negative for congestion, ear pain, sinus pressure and sore throat. Respiratory: Negative. Negative for cough, shortness of breath and wheezing. Cardiovascular: Negative for chest pain and palpitations. Gastrointestinal: Negative for abdominal pain, constipation, diarrhea, nausea and vomiting. Musculoskeletal: Negative for back pain and neck pain. Neurological: Negative for dizziness, weakness, light-headedness, numbness and headaches. Psychiatric/Behavioral: Positive for decreased concentration. Objective:   /64 (Site: Left Arm, Position: Sitting, Cuff Size: Medium Adult)   Pulse 80   Temp 98.3 °F (36.8 °C) (Oral)   Resp 14   Ht 5' 3\" (1.6 m)   Wt 160 lb (72.6 kg)   LMP 11/01/2017 (Approximate)   Breastfeeding? No   BMI 28.34 kg/m²     Physical Exam   Constitutional: She is oriented to person, place, and time. She appears well-developed and well-nourished. HENT:   Head: Normocephalic and atraumatic. Eyes: Conjunctivae and EOM are normal. Pupils are equal, round, and reactive to light. Neck: Normal range of motion. Neck supple. No thyromegaly present. Cardiovascular: Normal rate, regular rhythm and normal heart sounds. No murmur heard. Pulmonary/Chest: Effort normal and breath sounds normal. She has no wheezes. She exhibits no tenderness. Abdominal: Soft. Bowel sounds are normal. There is no tenderness. Musculoskeletal: Normal range of motion. She exhibits no edema or tenderness. Lymphadenopathy:     She has no cervical adenopathy. Neurological: She is alert and oriented to person, place, and time. She has normal reflexes. Coordination normal.   Skin: Skin is warm and dry. No rash noted. Psychiatric: She has a normal mood and affect. Thought content normal. Cognition and memory are not impaired. She exhibits normal recent memory and normal remote memory. She is inattentive.    Nursing

## 2017-12-18 ENCOUNTER — OFFICE VISIT (OUTPATIENT)
Dept: OBGYN | Age: 27
End: 2017-12-18

## 2017-12-18 VITALS
DIASTOLIC BLOOD PRESSURE: 70 MMHG | WEIGHT: 161 LBS | HEIGHT: 63 IN | SYSTOLIC BLOOD PRESSURE: 118 MMHG | BODY MASS INDEX: 28.53 KG/M2

## 2017-12-18 DIAGNOSIS — R87.611 PAP SMEAR OF CERVIX WITH ASCUS, CANNOT EXCLUDE HGSIL: Primary | ICD-10-CM

## 2017-12-18 PROCEDURE — G8484 FLU IMMUNIZE NO ADMIN: HCPCS | Performed by: OBSTETRICS & GYNECOLOGY

## 2017-12-18 PROCEDURE — 4004F PT TOBACCO SCREEN RCVD TLK: CPT | Performed by: OBSTETRICS & GYNECOLOGY

## 2017-12-18 PROCEDURE — G8427 DOCREV CUR MEDS BY ELIG CLIN: HCPCS | Performed by: OBSTETRICS & GYNECOLOGY

## 2017-12-18 PROCEDURE — G8417 CALC BMI ABV UP PARAM F/U: HCPCS | Performed by: OBSTETRICS & GYNECOLOGY

## 2017-12-18 PROCEDURE — 99213 OFFICE O/P EST LOW 20 MIN: CPT | Performed by: OBSTETRICS & GYNECOLOGY

## 2017-12-18 ASSESSMENT — ENCOUNTER SYMPTOMS
ABDOMINAL DISTENTION: 0
VOMITING: 0
CONSTIPATION: 0
ALLERGIC/IMMUNOLOGIC NEGATIVE: 1
NAUSEA: 0
RESPIRATORY NEGATIVE: 1
BLOOD IN STOOL: 0
ABDOMINAL PAIN: 0
ANAL BLEEDING: 0
RECTAL PAIN: 0
EYES NEGATIVE: 1
DIARRHEA: 0

## 2017-12-18 NOTE — PROGRESS NOTES
on file. Social History Main Topics    Smoking status: Current Every Day Smoker     Packs/day: 0.25     Types: Cigarettes    Smokeless tobacco: Never Used    Alcohol use Not on file      Comment: occ    Drug use: No    Sexual activity: Yes     Partners: Male     Other Topics Concern    Not on file     Social History Narrative    No narrative on file     Family History   Problem Relation Age of Onset    Cancer Maternal Aunt     Cancer Paternal Uncle     Cancer Maternal Grandfather     Cancer Paternal Grandfather     No Known Problems Paternal Grandmother     No Known Problems Maternal Grandmother     No Known Problems Father     No Known Problems Mother     No Known Problems Brother     No Known Problems Sister     No Known Problems Other     Breast Cancer Neg Hx     Colon Cancer Neg Hx     Diabetes Neg Hx     Eclampsia Neg Hx     Hypertension Neg Hx     Ovarian Cancer Neg Hx      Labor Neg Hx     Spont Abortions Neg Hx     Stroke Neg Hx        Review of Systems   Constitutional: Negative. Negative for activity change, appetite change, chills, diaphoresis, fatigue, fever and unexpected weight change. HENT: Negative. Eyes: Negative. Respiratory: Negative. Cardiovascular: Negative. Gastrointestinal: Negative for abdominal distention, abdominal pain, anal bleeding, blood in stool, constipation, diarrhea, nausea, rectal pain and vomiting. Endocrine: Negative. Genitourinary: Negative for decreased urine volume, difficulty urinating, dyspareunia, dysuria, enuresis, flank pain, frequency, genital sores, hematuria, menstrual problem, pelvic pain, urgency, vaginal bleeding, vaginal discharge and vaginal pain. Musculoskeletal: Negative. Skin: Negative. Allergic/Immunologic: Negative. Neurological: Negative. Hematological: Negative. Psychiatric/Behavioral: Negative.         Objective:     Physical Exam   Constitutional: She is oriented to person, place, orders of the defined types were placed in this encounter. Orders placed this encounter:  No orders of the defined types were placed in this encounter. Follow up:  Post-op.

## 2017-12-19 ENCOUNTER — HOSPITAL ENCOUNTER (OUTPATIENT)
Dept: PREADMISSION TESTING | Age: 27
Discharge: HOME | End: 2017-12-19
Payer: COMMERCIAL

## 2017-12-19 VITALS
WEIGHT: 157 LBS | HEIGHT: 64 IN | TEMPERATURE: 97.5 F | OXYGEN SATURATION: 98 % | DIASTOLIC BLOOD PRESSURE: 69 MMHG | HEART RATE: 80 BPM | RESPIRATION RATE: 16 BRPM | SYSTOLIC BLOOD PRESSURE: 112 MMHG | BODY MASS INDEX: 26.8 KG/M2

## 2017-12-19 DIAGNOSIS — R87.613 HGSIL (HIGH GRADE SQUAMOUS INTRAEPITHELIAL LESION) ON PAP SMEAR OF CERVIX: ICD-10-CM

## 2017-12-19 LAB
ALBUMIN SERPL-MCNC: 4 G/DL (ref 3.9–4.9)
ALP BLD-CCNC: 44 U/L (ref 40–130)
ALT SERPL-CCNC: 11 U/L (ref 0–33)
ANION GAP SERPL CALCULATED.3IONS-SCNC: 12 MEQ/L (ref 7–13)
APTT: 30.6 SEC (ref 21.6–35.4)
AST SERPL-CCNC: 15 U/L (ref 0–35)
BASOPHILS ABSOLUTE: 0.1 K/UL (ref 0–0.2)
BASOPHILS RELATIVE PERCENT: 1 %
BILIRUB SERPL-MCNC: 0.8 MG/DL (ref 0–1.2)
BILIRUBIN URINE: NEGATIVE
BLOOD, URINE: NEGATIVE
BUN BLDV-MCNC: 7 MG/DL (ref 6–20)
CALCIUM SERPL-MCNC: 8.7 MG/DL (ref 8.6–10.2)
CHLORIDE BLD-SCNC: 101 MEQ/L (ref 98–107)
CLARITY: ABNORMAL
CO2: 25 MEQ/L (ref 22–29)
COLOR: YELLOW
CREAT SERPL-MCNC: 0.71 MG/DL (ref 0.5–0.9)
EOSINOPHILS ABSOLUTE: 0.1 K/UL (ref 0–0.7)
EOSINOPHILS RELATIVE PERCENT: 1.5 %
GFR AFRICAN AMERICAN: >60
GFR NON-AFRICAN AMERICAN: >60
GLOBULIN: 2.5 G/DL (ref 2.3–3.5)
GLUCOSE BLD-MCNC: 73 MG/DL (ref 74–109)
GLUCOSE URINE: NEGATIVE MG/DL
HCT VFR BLD CALC: 42.6 % (ref 37–47)
HEMOGLOBIN: 14.4 G/DL (ref 12–16)
INR BLD: 1.1
KETONES, URINE: NEGATIVE MG/DL
LEUKOCYTE ESTERASE, URINE: NEGATIVE
LYMPHOCYTES ABSOLUTE: 2.2 K/UL (ref 1–4.8)
LYMPHOCYTES RELATIVE PERCENT: 36.1 %
MCH RBC QN AUTO: 32.8 PG (ref 27–31.3)
MCHC RBC AUTO-ENTMCNC: 33.8 % (ref 33–37)
MCV RBC AUTO: 97 FL (ref 82–100)
MONOCYTES ABSOLUTE: 0.6 K/UL (ref 0.2–0.8)
MONOCYTES RELATIVE PERCENT: 9.9 %
NEUTROPHILS ABSOLUTE: 3.2 K/UL (ref 1.4–6.5)
NEUTROPHILS RELATIVE PERCENT: 51.5 %
NITRITE, URINE: NEGATIVE
PDW BLD-RTO: 13.2 % (ref 11.5–14.5)
PH UA: 7.5 (ref 5–9)
PLATELET # BLD: 279 K/UL (ref 130–400)
POTASSIUM SERPL-SCNC: 4.4 MEQ/L (ref 3.5–5.1)
PROTEIN UA: NEGATIVE MG/DL
PROTHROMBIN TIME: 11.3 SEC (ref 8.1–13.7)
RBC # BLD: 4.4 M/UL (ref 4.2–5.4)
SODIUM BLD-SCNC: 138 MEQ/L (ref 132–144)
SPECIFIC GRAVITY UA: 1.01 (ref 1–1.03)
SPECIMEN STATUS: NORMAL
TOTAL PROTEIN: 6.5 G/DL (ref 6.4–8.1)
URINE REFLEX TO CULTURE: ABNORMAL
UROBILINOGEN, URINE: 1 E.U./DL
WBC # BLD: 6.2 K/UL (ref 4.8–10.8)

## 2017-12-19 PROCEDURE — 85025 COMPLETE CBC W/AUTO DIFF WBC: CPT

## 2017-12-19 PROCEDURE — 85730 THROMBOPLASTIN TIME PARTIAL: CPT

## 2017-12-19 PROCEDURE — 81003 URINALYSIS AUTO W/O SCOPE: CPT

## 2017-12-19 PROCEDURE — 85610 PROTHROMBIN TIME: CPT

## 2017-12-19 PROCEDURE — 80053 COMPREHEN METABOLIC PANEL: CPT

## 2017-12-19 RX ORDER — SODIUM CHLORIDE, SODIUM LACTATE, POTASSIUM CHLORIDE, CALCIUM CHLORIDE 600; 310; 30; 20 MG/100ML; MG/100ML; MG/100ML; MG/100ML
INJECTION, SOLUTION INTRAVENOUS CONTINUOUS
Status: CANCELLED | OUTPATIENT
Start: 2017-12-20

## 2017-12-20 ENCOUNTER — ANESTHESIA (OUTPATIENT)
Dept: OPERATING ROOM | Age: 27
End: 2017-12-20
Payer: COMMERCIAL

## 2017-12-20 ENCOUNTER — ANESTHESIA EVENT (OUTPATIENT)
Dept: OPERATING ROOM | Age: 27
End: 2017-12-20
Payer: COMMERCIAL

## 2017-12-20 ENCOUNTER — HOSPITAL ENCOUNTER (OUTPATIENT)
Age: 27
Setting detail: OUTPATIENT SURGERY
Discharge: HOME | End: 2017-12-20
Attending: OBSTETRICS & GYNECOLOGY | Admitting: OBSTETRICS & GYNECOLOGY
Payer: COMMERCIAL

## 2017-12-20 VITALS
WEIGHT: 157 LBS | DIASTOLIC BLOOD PRESSURE: 62 MMHG | TEMPERATURE: 97.8 F | HEART RATE: 61 BPM | SYSTOLIC BLOOD PRESSURE: 108 MMHG | BODY MASS INDEX: 26.8 KG/M2 | OXYGEN SATURATION: 99 % | RESPIRATION RATE: 16 BRPM | HEIGHT: 64 IN

## 2017-12-20 VITALS — DIASTOLIC BLOOD PRESSURE: 46 MMHG | TEMPERATURE: 95.9 F | OXYGEN SATURATION: 100 % | SYSTOLIC BLOOD PRESSURE: 85 MMHG

## 2017-12-20 PROCEDURE — 2500000003 HC RX 250 WO HCPCS: Performed by: NURSE ANESTHETIST, CERTIFIED REGISTERED

## 2017-12-20 PROCEDURE — 2500000003 HC RX 250 WO HCPCS

## 2017-12-20 PROCEDURE — 3700000001 HC ADD 15 MINUTES (ANESTHESIA): Performed by: OBSTETRICS & GYNECOLOGY

## 2017-12-20 PROCEDURE — 88305 TISSUE EXAM BY PATHOLOGIST: CPT

## 2017-12-20 PROCEDURE — 2580000003 HC RX 258

## 2017-12-20 PROCEDURE — 7100000011 HC PHASE II RECOVERY - ADDTL 15 MIN: Performed by: OBSTETRICS & GYNECOLOGY

## 2017-12-20 PROCEDURE — 7100000010 HC PHASE II RECOVERY - FIRST 15 MIN: Performed by: OBSTETRICS & GYNECOLOGY

## 2017-12-20 PROCEDURE — A6252 ABSORPT DRG >16 <=48 W/O BDR: HCPCS | Performed by: OBSTETRICS & GYNECOLOGY

## 2017-12-20 PROCEDURE — 6360000002 HC RX W HCPCS: Performed by: NURSE ANESTHETIST, CERTIFIED REGISTERED

## 2017-12-20 PROCEDURE — 88307 TISSUE EXAM BY PATHOLOGIST: CPT

## 2017-12-20 PROCEDURE — 2500000003 HC RX 250 WO HCPCS: Performed by: OBSTETRICS & GYNECOLOGY

## 2017-12-20 PROCEDURE — 3700000000 HC ANESTHESIA ATTENDED CARE: Performed by: OBSTETRICS & GYNECOLOGY

## 2017-12-20 PROCEDURE — 6370000000 HC RX 637 (ALT 250 FOR IP): Performed by: OBSTETRICS & GYNECOLOGY

## 2017-12-20 PROCEDURE — 2580000003 HC RX 258: Performed by: OBSTETRICS & GYNECOLOGY

## 2017-12-20 PROCEDURE — 88342 IMHCHEM/IMCYTCHM 1ST ANTB: CPT

## 2017-12-20 PROCEDURE — 3600000002 HC SURGERY LEVEL 2 BASE: Performed by: OBSTETRICS & GYNECOLOGY

## 2017-12-20 PROCEDURE — 3600000012 HC SURGERY LEVEL 2 ADDTL 15MIN: Performed by: OBSTETRICS & GYNECOLOGY

## 2017-12-20 PROCEDURE — 88341 IMHCHEM/IMCYTCHM EA ADD ANTB: CPT

## 2017-12-20 PROCEDURE — 57522 CONIZATION OF CERVIX: CPT | Performed by: OBSTETRICS & GYNECOLOGY

## 2017-12-20 RX ORDER — LIDOCAINE HYDROCHLORIDE 10 MG/ML
1 INJECTION, SOLUTION EPIDURAL; INFILTRATION; INTRACAUDAL; PERINEURAL
Status: COMPLETED | OUTPATIENT
Start: 2017-12-20 | End: 2017-12-20

## 2017-12-20 RX ORDER — ONDANSETRON 2 MG/ML
4 INJECTION INTRAMUSCULAR; INTRAVENOUS
Status: DISCONTINUED | OUTPATIENT
Start: 2017-12-20 | End: 2017-12-20 | Stop reason: HOSPADM

## 2017-12-20 RX ORDER — LIDOCAINE HYDROCHLORIDE AND EPINEPHRINE 10; 10 MG/ML; UG/ML
INJECTION, SOLUTION INFILTRATION; PERINEURAL PRN
Status: DISCONTINUED | OUTPATIENT
Start: 2017-12-20 | End: 2017-12-20 | Stop reason: HOSPADM

## 2017-12-20 RX ORDER — IBUPROFEN 400 MG/1
400 TABLET ORAL EVERY 6 HOURS PRN
Status: DISCONTINUED | OUTPATIENT
Start: 2017-12-20 | End: 2017-12-20 | Stop reason: HOSPADM

## 2017-12-20 RX ORDER — LIDOCAINE HYDROCHLORIDE 10 MG/ML
INJECTION, SOLUTION EPIDURAL; INFILTRATION; INTRACAUDAL; PERINEURAL
Status: COMPLETED
Start: 2017-12-20 | End: 2017-12-20

## 2017-12-20 RX ORDER — SODIUM CHLORIDE, SODIUM LACTATE, POTASSIUM CHLORIDE, CALCIUM CHLORIDE 600; 310; 30; 20 MG/100ML; MG/100ML; MG/100ML; MG/100ML
INJECTION, SOLUTION INTRAVENOUS CONTINUOUS
Status: DISCONTINUED | OUTPATIENT
Start: 2017-12-20 | End: 2017-12-20 | Stop reason: HOSPADM

## 2017-12-20 RX ORDER — MIDAZOLAM HYDROCHLORIDE 1 MG/ML
INJECTION INTRAMUSCULAR; INTRAVENOUS PRN
Status: DISCONTINUED | OUTPATIENT
Start: 2017-12-20 | End: 2017-12-20 | Stop reason: SDUPTHER

## 2017-12-20 RX ORDER — ONDANSETRON 2 MG/ML
4 INJECTION INTRAMUSCULAR; INTRAVENOUS EVERY 6 HOURS PRN
Status: DISCONTINUED | OUTPATIENT
Start: 2017-12-20 | End: 2017-12-20 | Stop reason: HOSPADM

## 2017-12-20 RX ORDER — FERRIC SUBSULFATE 0.21 G/G
LIQUID TOPICAL PRN
Status: DISCONTINUED | OUTPATIENT
Start: 2017-12-20 | End: 2017-12-20 | Stop reason: HOSPADM

## 2017-12-20 RX ORDER — KETOROLAC TROMETHAMINE 30 MG/ML
30 INJECTION, SOLUTION INTRAMUSCULAR; INTRAVENOUS EVERY 6 HOURS
Status: DISCONTINUED | OUTPATIENT
Start: 2017-12-20 | End: 2017-12-20 | Stop reason: HOSPADM

## 2017-12-20 RX ORDER — SODIUM CHLORIDE 0.9 % (FLUSH) 0.9 %
10 SYRINGE (ML) INJECTION PRN
Status: DISCONTINUED | OUTPATIENT
Start: 2017-12-20 | End: 2017-12-20 | Stop reason: HOSPADM

## 2017-12-20 RX ORDER — FENTANYL CITRATE 50 UG/ML
INJECTION, SOLUTION INTRAMUSCULAR; INTRAVENOUS PRN
Status: DISCONTINUED | OUTPATIENT
Start: 2017-12-20 | End: 2017-12-20 | Stop reason: SDUPTHER

## 2017-12-20 RX ORDER — MEPERIDINE HYDROCHLORIDE 25 MG/ML
12.5 INJECTION INTRAMUSCULAR; INTRAVENOUS; SUBCUTANEOUS EVERY 5 MIN PRN
Status: DISCONTINUED | OUTPATIENT
Start: 2017-12-20 | End: 2017-12-20 | Stop reason: HOSPADM

## 2017-12-20 RX ORDER — LIDOCAINE HYDROCHLORIDE 20 MG/ML
INJECTION, SOLUTION INFILTRATION; PERINEURAL PRN
Status: DISCONTINUED | OUTPATIENT
Start: 2017-12-20 | End: 2017-12-20 | Stop reason: SDUPTHER

## 2017-12-20 RX ORDER — DIPHENHYDRAMINE HYDROCHLORIDE 50 MG/ML
12.5 INJECTION INTRAMUSCULAR; INTRAVENOUS
Status: DISCONTINUED | OUTPATIENT
Start: 2017-12-20 | End: 2017-12-20 | Stop reason: HOSPADM

## 2017-12-20 RX ORDER — SODIUM CHLORIDE 0.9 % (FLUSH) 0.9 %
10 SYRINGE (ML) INJECTION EVERY 12 HOURS SCHEDULED
Status: DISCONTINUED | OUTPATIENT
Start: 2017-12-20 | End: 2017-12-20 | Stop reason: HOSPADM

## 2017-12-20 RX ORDER — ACETIC ACID 5 %
LIQUID (ML) MISCELLANEOUS PRN
Status: DISCONTINUED | OUTPATIENT
Start: 2017-12-20 | End: 2017-12-20 | Stop reason: HOSPADM

## 2017-12-20 RX ORDER — SODIUM CHLORIDE 0.9 % (FLUSH) 0.9 %
SYRINGE (ML) INJECTION
Status: DISCONTINUED
Start: 2017-12-20 | End: 2017-12-20 | Stop reason: HOSPADM

## 2017-12-20 RX ORDER — ONDANSETRON 2 MG/ML
INJECTION INTRAMUSCULAR; INTRAVENOUS PRN
Status: DISCONTINUED | OUTPATIENT
Start: 2017-12-20 | End: 2017-12-20 | Stop reason: SDUPTHER

## 2017-12-20 RX ORDER — DEXAMETHASONE SODIUM PHOSPHATE 4 MG/ML
INJECTION, SOLUTION INTRA-ARTICULAR; INTRALESIONAL; INTRAMUSCULAR; INTRAVENOUS; SOFT TISSUE PRN
Status: DISCONTINUED | OUTPATIENT
Start: 2017-12-20 | End: 2017-12-20 | Stop reason: SDUPTHER

## 2017-12-20 RX ORDER — FENTANYL CITRATE 50 UG/ML
50 INJECTION, SOLUTION INTRAMUSCULAR; INTRAVENOUS EVERY 10 MIN PRN
Status: DISCONTINUED | OUTPATIENT
Start: 2017-12-20 | End: 2017-12-20 | Stop reason: HOSPADM

## 2017-12-20 RX ORDER — IBUPROFEN 800 MG/1
800 TABLET ORAL EVERY 8 HOURS PRN
Qty: 120 TABLET | Refills: 3 | Status: SHIPPED | OUTPATIENT
Start: 2017-12-20 | End: 2018-02-20 | Stop reason: SDUPTHER

## 2017-12-20 RX ORDER — SODIUM CHLORIDE, SODIUM LACTATE, POTASSIUM CHLORIDE, CALCIUM CHLORIDE 600; 310; 30; 20 MG/100ML; MG/100ML; MG/100ML; MG/100ML
INJECTION, SOLUTION INTRAVENOUS
Status: COMPLETED
Start: 2017-12-20 | End: 2017-12-20

## 2017-12-20 RX ORDER — KETOROLAC TROMETHAMINE 30 MG/ML
INJECTION, SOLUTION INTRAMUSCULAR; INTRAVENOUS
Status: DISCONTINUED
Start: 2017-12-20 | End: 2017-12-20 | Stop reason: HOSPADM

## 2017-12-20 RX ORDER — PROPOFOL 10 MG/ML
INJECTION, EMULSION INTRAVENOUS PRN
Status: DISCONTINUED | OUTPATIENT
Start: 2017-12-20 | End: 2017-12-20 | Stop reason: SDUPTHER

## 2017-12-20 RX ORDER — MAGNESIUM HYDROXIDE 1200 MG/15ML
LIQUID ORAL CONTINUOUS PRN
Status: DISCONTINUED | OUTPATIENT
Start: 2017-12-20 | End: 2017-12-20 | Stop reason: HOSPADM

## 2017-12-20 RX ORDER — METOCLOPRAMIDE HYDROCHLORIDE 5 MG/ML
10 INJECTION INTRAMUSCULAR; INTRAVENOUS
Status: DISCONTINUED | OUTPATIENT
Start: 2017-12-20 | End: 2017-12-20 | Stop reason: HOSPADM

## 2017-12-20 RX ADMIN — CEFAZOLIN SODIUM 2 G: 1 INJECTION, SOLUTION INTRAVENOUS at 10:31

## 2017-12-20 RX ADMIN — PROPOFOL 50 MG: 10 INJECTION, EMULSION INTRAVENOUS at 10:35

## 2017-12-20 RX ADMIN — LIDOCAINE HYDROCHLORIDE 50 MG: 20 INJECTION, SOLUTION INFILTRATION; PERINEURAL at 10:35

## 2017-12-20 RX ADMIN — DEXAMETHASONE SODIUM PHOSPHATE 4 MG: 4 INJECTION INTRA-ARTICULAR; INTRALESIONAL; INTRAMUSCULAR; INTRAVENOUS; SOFT TISSUE at 10:40

## 2017-12-20 RX ADMIN — LIDOCAINE HYDROCHLORIDE 50 MG: 20 INJECTION, SOLUTION INFILTRATION; PERINEURAL at 10:38

## 2017-12-20 RX ADMIN — LIDOCAINE HYDROCHLORIDE 50 MG: 20 INJECTION, SOLUTION INFILTRATION; PERINEURAL at 10:42

## 2017-12-20 RX ADMIN — FENTANYL CITRATE 50 MCG: 50 INJECTION, SOLUTION INTRAMUSCULAR; INTRAVENOUS at 10:35

## 2017-12-20 RX ADMIN — SODIUM CHLORIDE, POTASSIUM CHLORIDE, SODIUM LACTATE AND CALCIUM CHLORIDE: 600; 310; 30; 20 INJECTION, SOLUTION INTRAVENOUS at 09:30

## 2017-12-20 RX ADMIN — LIDOCAINE HYDROCHLORIDE 0.1 ML: 10 INJECTION, SOLUTION EPIDURAL; INFILTRATION; INTRACAUDAL; PERINEURAL at 09:29

## 2017-12-20 RX ADMIN — ONDANSETRON 4 MG: 2 INJECTION INTRAMUSCULAR; INTRAVENOUS at 10:40

## 2017-12-20 RX ADMIN — LIDOCAINE HYDROCHLORIDE 50 MG: 20 INJECTION, SOLUTION INFILTRATION; PERINEURAL at 10:45

## 2017-12-20 RX ADMIN — MIDAZOLAM HYDROCHLORIDE 2 MG: 1 INJECTION, SOLUTION INTRAMUSCULAR; INTRAVENOUS at 10:30

## 2017-12-20 RX ADMIN — SODIUM CHLORIDE, SODIUM LACTATE, POTASSIUM CHLORIDE, CALCIUM CHLORIDE: 600; 310; 30; 20 INJECTION, SOLUTION INTRAVENOUS at 09:30

## 2017-12-20 RX ADMIN — FENTANYL CITRATE 50 MCG: 50 INJECTION, SOLUTION INTRAMUSCULAR; INTRAVENOUS at 10:45

## 2017-12-20 ASSESSMENT — PULMONARY FUNCTION TESTS
PIF_VALUE: 15
PIF_VALUE: 18
PIF_VALUE: 15
PIF_VALUE: 21
PIF_VALUE: 15
PIF_VALUE: 19
PIF_VALUE: 18
PIF_VALUE: 15
PIF_VALUE: 15
PIF_VALUE: 18
PIF_VALUE: 19
PIF_VALUE: 18
PIF_VALUE: 2
PIF_VALUE: 15
PIF_VALUE: 18
PIF_VALUE: 20
PIF_VALUE: 15
PIF_VALUE: 19
PIF_VALUE: 19
PIF_VALUE: 15
PIF_VALUE: 2
PIF_VALUE: 15
PIF_VALUE: 21
PIF_VALUE: 0
PIF_VALUE: 15

## 2017-12-20 ASSESSMENT — PAIN DESCRIPTION - PAIN TYPE: TYPE: SURGICAL PAIN

## 2017-12-20 ASSESSMENT — PAIN - FUNCTIONAL ASSESSMENT: PAIN_FUNCTIONAL_ASSESSMENT: 0-10

## 2017-12-20 ASSESSMENT — LIFESTYLE VARIABLES: SMOKING_STATUS: 1

## 2017-12-20 ASSESSMENT — PAIN SCALES - GENERAL: PAINLEVEL_OUTOF10: 2

## 2017-12-20 NOTE — OP NOTE
[address]                       [address], [address] [facility]                                 OPERATIVE REPORT    PATIENT NAME: Del Porras                    :        1990  MED REC NO:   [id number]                            ROOM:  ACCOUNT NO:   [de-identified]                           ADMIT DATE: 2017  PROVIDER:     Edward Bruce MD            DATE OF PROCEDURE:  2017    SERVICE:  Gynecology. PREOPERATIVE DIAGNOSIS:  A 79-year-old female with HGSIL. POSTOPERATIVE DIAGNOSIS:  A 79-year-old female with HGSIL. OPERATION PERFORMED:  LEEP. [facility]  [facility]  [facility]. ANESTHESIA:  General.    IV FLUIDS:  500 mL. URINE OUTPUT:  30 mL. ESTIMATED BLOOD LOSS:  Minimal.    COMPLICATIONS:  None. DISPOSITION:  Stable to recovery room. FINDINGS:  HGSIL. OPERATIVE PROCEDURE:  The risks and benefits of this procedure were  discussed with the patient in detail including but are not limited to the  risk of anesthesia, potential for transfusion, infection, and blood loss,  also the potential for complication, injury, need for removal or repair of  uterus, tubes, ovaries, bowel, bladder, ureters, blood vessels, and nerves. Pre and postoperative expectations were discussed as well. The patient  verbalized understanding and opted to proceed. Consents were signed. The  patient was taken to the operating room. She was placed under general  anesthesia. She was then prepped and draped in the regular sterile fashion  for a vaginal procedure. A coated speculum was placed in the vagina for  direct visualization of the cervix. The anterior lip of the cervix was  grasped with single-tooth tenaculum, and the cervix was circumferentially  injected with 10 mL of 1% lidocaine with epinephrine. At this time,  tenaculum was removed.   A loop electrocautery excisional procedure was  performed in routine fashion without difficulty. The base of the biopsy  site was cauterized with a 5-mm roller ball with excellent hemostasis. Monsel solution was placed. Once again, hemostasis was noted to be good. All instruments were removed from the vagina. Instrument and tape count  was correct at the end of the procedure, and the patient appeared to  tolerate the procedure well. She was transferred to the recovery room in  stable condition.         Alyse Berry MD    D: 12/20/2017 10:55:31       T: 12/20/2017 13:00:25     [email or web address]  Job#: [id number]     Doc#: [id number]    CC:

## 2017-12-20 NOTE — OP NOTE
PRE-OP DX:  29 yo female with HGSIL      POST-OP DX:  Same      PROCEDURE:  LEEP      SURGEON:  Sri Muñoz MD      ASSISTANT:  None      ANESTHESIA:  General      IVF:  500 cc      URINE OUTPUT: 30 cc      EBL:  Minimal    COMPLICATIONS:  None      FINDINGS:  HGSIL

## 2017-12-20 NOTE — H&P
decreased urine volume, difficulty urinating, dyspareunia, dysuria, enuresis, flank pain, frequency, genital sores, hematuria, menstrual problem, pelvic pain, urgency, vaginal bleeding, vaginal discharge and vaginal pain. Musculoskeletal: Negative. Skin: Negative. Allergic/Immunologic: Negative. Neurological: Negative. Hematological: Negative. Psychiatric/Behavioral: Negative.          Objective:      Physical Exam   Constitutional: She is oriented to person, place, and time. She appears well-developed and well-nourished. HENT:   Head: Normocephalic. Neck: Normal range of motion. Neck supple. No thyromegaly present. Cardiovascular: Normal rate, regular rhythm and normal heart sounds. Pulmonary/Chest: Effort normal and breath sounds normal. No respiratory distress. She has no wheezes. She has no rales. She exhibits no tenderness. Abdominal: Soft. Bowel sounds are normal. She exhibits no distension and no mass. There is no tenderness. There is no rebound and no guarding. Hernia confirmed negative in the right inguinal area and confirmed negative in the left inguinal area. Genitourinary: Rectum normal, vagina normal and uterus normal. No breast swelling, tenderness, discharge or bleeding. No labial fusion. There is no rash, tenderness, lesion or injury on the right labia. There is no rash, tenderness, lesion or injury on the left labia. Uterus is not deviated, not enlarged, not fixed and not tender. Cervix exhibits no motion tenderness, no discharge and no friability. Right adnexum displays no mass, no tenderness and no fullness. Left adnexum displays no mass, no tenderness and no fullness. No erythema, tenderness or bleeding in the vagina. No foreign body in the vagina. No signs of injury around the vagina. No vaginal discharge found. Musculoskeletal: Normal range of motion. She exhibits no edema or tenderness. Lymphadenopathy:     She has no cervical adenopathy.         Right: No

## 2017-12-20 NOTE — ANESTHESIA PRE PROCEDURE
Department of Anesthesiology  Preprocedure Note       Name:  Gracie Mobley   Age:  28 y.o.  :  1990                                          MRN:  [id number]         Date:  2017      Surgeon: [place]):  Solomon Holliday MD    Procedure: Procedure(s):  LEEP    Medications prior to admission:   Prior to Admission medications    Medication Sig Start Date End Date Taking? Authorizing Provider   benzonatate (TESSALON) 200 MG capsule Take 1 capsule by mouth 3 times daily as needed for Cough 17  Yes Nori Celestin NP   albuterol sulfate HFA (PROAIR HFA) 108 (90 Base) MCG/ACT inhaler Inhale 2 puffs into the lungs every 6 hours as needed for Wheezing 17  Yes Jose Jordan DO   amphetamine-dextroamphetamine (ADDERALL XR) 20 MG extended release capsule Take 1 capsule by mouth every morning . Earliest Fill Date: 17   Jose Jordan DO   etonogestrel (NEXPLANON) 68 MG implant Inject 68 mg into the skin once    Historical Provider, MD       Current medications:    Current Facility-Administered Medications   Medication Dose Route Frequency Provider Last Rate Last Dose    lactated ringers infusion   Intravenous Continuous Juan K Harman, DO        sodium chloride flush 0.9 % injection 10 mL  10 mL Intravenous 2 times per day Miguelito Cadena DO        sodium chloride flush 0.9 % injection 10 mL  10 mL Intravenous PRN Juan MAURY Harman, DO        lidocaine PF 1 % injection 1 mL  1 mL Intradermal Once PRN Miguelito Cadena DO        ceFAZolin (ANCEF) 2 g in dextrose 3 % 50 mL IVPB (duplex)  2 g Intravenous Once Savanna Tamayo NP        lactated ringers infusion   Intravenous Continuous Savanna Beecammym, NP        lidocaine PF 1 % injection             lactated ringers infusion                Allergies:     Allergies   Allergen Reactions    Shrimp (Diagnostic) Hives    Tramadol Itching       Problem List:    Patient Active Problem List   Diagnosis Code    ADHD (attention deficit hyperactivity disorder) F90.9    Nicotine abuse Z72.0    HGSIL (high grade squamous intraepithelial lesion) on Pap smear of cervix R87.613       Past Medical History:        Diagnosis Date    ADHD (attention deficit hyperactivity disorder)     Anxiety     HGSIL (high grade squamous intraepithelial lesion) on Pap smear of cervix 12/19/2017       Past Surgical History:  No past surgical history on file. Social History:    Social History   Substance Use Topics    Smoking status: Current Every Day Smoker     Packs/day: 0.50     Years: 17.00     Types: Cigarettes    Smokeless tobacco: Never Used    Alcohol use 0.0 oz/week      Comment: rare social                                Ready to quit: Not Answered  Counseling given: Not Answered      Vital Signs (Current):   Vitals:    12/20/17 0915   BP: 104/64   Pulse: 76   Resp: 16   Temp: 97.1 °F (36.2 °C)   TempSrc: Temporal   SpO2: 100%   Weight: 157 lb (71.2 kg)   Height: 5' 4\" (1.626 m)                                              BP Readings from Last 3 Encounters:   12/20/17 104/64   12/19/17 112/69   12/18/17 118/70       NPO Status: Time of last liquid consumption: 0000                        Time of last solid consumption: 0000                        Date of last liquid consumption: 12/20/17                        Date of last solid food consumption: 12/20/17    BMI:   Wt Readings from Last 3 Encounters:   12/20/17 157 lb (71.2 kg)   12/19/17 157 lb (71.2 kg)   12/18/17 161 lb (73 kg)     Body mass index is 26.95 kg/m².     CBC:   Lab Results   Component Value Date    WBC 6.2 12/19/2017    RBC 4.40 12/19/2017    HGB 14.4 12/19/2017    HCT 42.6 12/19/2017    MCV 97.0 12/19/2017    RDW 13.2 12/19/2017     12/19/2017       CMP:   Lab Results   Component Value Date     12/19/2017    K 4.4 12/19/2017     12/19/2017    CO2 25 12/19/2017    BUN 7 12/19/2017    CREATININE 0.71 12/19/2017    GFRAA >60.0 12/19/2017

## 2018-02-20 ENCOUNTER — OFFICE VISIT (OUTPATIENT)
Dept: PRIMARY CARE CLINIC | Age: 28
End: 2018-02-20
Payer: COMMERCIAL

## 2018-02-20 VITALS
BODY MASS INDEX: 28 KG/M2 | DIASTOLIC BLOOD PRESSURE: 78 MMHG | WEIGHT: 158 LBS | HEART RATE: 118 BPM | OXYGEN SATURATION: 97 % | SYSTOLIC BLOOD PRESSURE: 102 MMHG | TEMPERATURE: 97.8 F | HEIGHT: 63 IN | RESPIRATION RATE: 14 BRPM

## 2018-02-20 DIAGNOSIS — Z72.0 NICOTINE ABUSE: ICD-10-CM

## 2018-02-20 DIAGNOSIS — G44.049 CHRONIC PAROXYSMAL HEMICRANIA, NOT INTRACTABLE: ICD-10-CM

## 2018-02-20 DIAGNOSIS — F90.0 ATTENTION DEFICIT HYPERACTIVITY DISORDER (ADHD), PREDOMINANTLY INATTENTIVE TYPE: Primary | ICD-10-CM

## 2018-02-20 PROCEDURE — 4004F PT TOBACCO SCREEN RCVD TLK: CPT | Performed by: FAMILY MEDICINE

## 2018-02-20 PROCEDURE — G8484 FLU IMMUNIZE NO ADMIN: HCPCS | Performed by: FAMILY MEDICINE

## 2018-02-20 PROCEDURE — 99213 OFFICE O/P EST LOW 20 MIN: CPT | Performed by: FAMILY MEDICINE

## 2018-02-20 PROCEDURE — G8417 CALC BMI ABV UP PARAM F/U: HCPCS | Performed by: FAMILY MEDICINE

## 2018-02-20 PROCEDURE — G8427 DOCREV CUR MEDS BY ELIG CLIN: HCPCS | Performed by: FAMILY MEDICINE

## 2018-02-20 RX ORDER — DEXTROAMPHETAMINE SACCHARATE, AMPHETAMINE ASPARTATE MONOHYDRATE, DEXTROAMPHETAMINE SULFATE AND AMPHETAMINE SULFATE 5; 5; 5; 5 MG/1; MG/1; MG/1; MG/1
20 CAPSULE, EXTENDED RELEASE ORAL EVERY MORNING
Qty: 30 CAPSULE | Refills: 0 | Status: SHIPPED | OUTPATIENT
Start: 2018-04-19 | End: 2018-05-21

## 2018-02-20 RX ORDER — IBUPROFEN 800 MG/1
800 TABLET ORAL EVERY 8 HOURS PRN
Qty: 120 TABLET | Refills: 2 | Status: SHIPPED | OUTPATIENT
Start: 2018-02-20 | End: 2019-07-08 | Stop reason: CLARIF

## 2018-02-20 RX ORDER — DEXTROAMPHETAMINE SACCHARATE, AMPHETAMINE ASPARTATE MONOHYDRATE, DEXTROAMPHETAMINE SULFATE AND AMPHETAMINE SULFATE 5; 5; 5; 5 MG/1; MG/1; MG/1; MG/1
20 CAPSULE, EXTENDED RELEASE ORAL EVERY MORNING
Qty: 30 CAPSULE | Refills: 0 | Status: SHIPPED | OUTPATIENT
Start: 2018-02-21 | End: 2018-03-30

## 2018-02-20 RX ORDER — DEXTROAMPHETAMINE SACCHARATE, AMPHETAMINE ASPARTATE MONOHYDRATE, DEXTROAMPHETAMINE SULFATE AND AMPHETAMINE SULFATE 5; 5; 5; 5 MG/1; MG/1; MG/1; MG/1
20 CAPSULE, EXTENDED RELEASE ORAL EVERY MORNING
Qty: 30 CAPSULE | Refills: 0 | Status: SHIPPED | OUTPATIENT
Start: 2018-03-20 | End: 2018-03-30

## 2018-02-20 ASSESSMENT — ENCOUNTER SYMPTOMS
SORE THROAT: 0
CONSTIPATION: 0
VOMITING: 0
SINUS PRESSURE: 0
DIARRHEA: 0
COUGH: 0
RESPIRATORY NEGATIVE: 1
SHORTNESS OF BREATH: 0
WHEEZING: 0
BACK PAIN: 0
NAUSEA: 0
ABDOMINAL PAIN: 0

## 2018-02-20 ASSESSMENT — PATIENT HEALTH QUESTIONNAIRE - PHQ9
1. LITTLE INTEREST OR PLEASURE IN DOING THINGS: 0
SUM OF ALL RESPONSES TO PHQ QUESTIONS 1-9: 0
SUM OF ALL RESPONSES TO PHQ9 QUESTIONS 1 & 2: 0
2. FEELING DOWN, DEPRESSED OR HOPELESS: 0

## 2018-02-20 NOTE — PROGRESS NOTES
impaired. She exhibits normal recent memory and normal remote memory. She is inattentive. Nursing note and vitals reviewed. Assessment:     1. Attention deficit hyperactivity disorder (ADHD), predominantly inattentive type  amphetamine-dextroamphetamine (ADDERALL XR) 20 MG extended release capsule    amphetamine-dextroamphetamine (ADDERALL XR) 20 MG extended release capsule    amphetamine-dextroamphetamine (ADDERALL XR) 20 MG extended release capsule    CANCELED: Pain Management Drug Screen   2. Nicotine abuse     3. Chronic paroxysmal hemicrania, not intractable  ibuprofen (ADVIL;MOTRIN) 800 MG tablet       Plan:      No orders of the defined types were placed in this encounter. Orders Placed This Encounter   Medications    ibuprofen (ADVIL;MOTRIN) 800 MG tablet     Sig: Take 1 tablet by mouth every 8 hours as needed for Pain     Dispense:  120 tablet     Refill:  2    amphetamine-dextroamphetamine (ADDERALL XR) 20 MG extended release capsule     Sig: Take 1 capsule by mouth every morning for 30 days. Earliest Fill Date: 4/19/18     Dispense:  30 capsule     Refill:  0    amphetamine-dextroamphetamine (ADDERALL XR) 20 MG extended release capsule     Sig: Take 1 capsule by mouth every morning for 30 days. Earliest Fill Date: 3/20/18     Dispense:  30 capsule     Refill:  0    amphetamine-dextroamphetamine (ADDERALL XR) 20 MG extended release capsule     Sig: Take 1 capsule by mouth every morning for 30 days. Earliest Fill Date: 2/21/18     Dispense:  30 capsule     Refill:  0       Controlled Substances Monitoring:      Return in about 3 months (around 5/20/2018) for Review of Labs & Diagnostic Testing, Routine follow up. ISimba ([address] [place] [address])  , am scribing for and in the presence of Reyes Soja, DO.  Electronically signed by :  Simba Lambert ([address] [place] [address])      Sherri Isaac DO, personally performed the services described in this documentation, as scribed by Genoveva Fitzgerald in my presence, and it is both accurate and complete.  Electronically signed by: Axel Reed DO    2/20/18 10:56 PM    Axel Reed DO

## 2018-03-15 ENCOUNTER — OFFICE VISIT (OUTPATIENT)
Dept: OBGYN CLINIC | Age: 28
End: 2018-03-15
Payer: COMMERCIAL

## 2018-03-15 VITALS
WEIGHT: 160 LBS | BODY MASS INDEX: 28.35 KG/M2 | HEIGHT: 63 IN | SYSTOLIC BLOOD PRESSURE: 110 MMHG | DIASTOLIC BLOOD PRESSURE: 70 MMHG

## 2018-03-15 DIAGNOSIS — N94.10 DYSPAREUNIA, FEMALE: ICD-10-CM

## 2018-03-15 DIAGNOSIS — N92.6 LATE MENSES: ICD-10-CM

## 2018-03-15 DIAGNOSIS — Z09 POSTOP CHECK: Primary | ICD-10-CM

## 2018-03-15 LAB
CONTROL: NORMAL
PREGNANCY TEST URINE, POC: NEGATIVE

## 2018-03-15 PROCEDURE — G8427 DOCREV CUR MEDS BY ELIG CLIN: HCPCS | Performed by: OBSTETRICS & GYNECOLOGY

## 2018-03-15 PROCEDURE — 99213 OFFICE O/P EST LOW 20 MIN: CPT | Performed by: OBSTETRICS & GYNECOLOGY

## 2018-03-15 PROCEDURE — 81025 URINE PREGNANCY TEST: CPT | Performed by: OBSTETRICS & GYNECOLOGY

## 2018-03-15 PROCEDURE — G8417 CALC BMI ABV UP PARAM F/U: HCPCS | Performed by: OBSTETRICS & GYNECOLOGY

## 2018-03-15 PROCEDURE — 4004F PT TOBACCO SCREEN RCVD TLK: CPT | Performed by: OBSTETRICS & GYNECOLOGY

## 2018-03-15 PROCEDURE — G8484 FLU IMMUNIZE NO ADMIN: HCPCS | Performed by: OBSTETRICS & GYNECOLOGY

## 2018-03-15 ASSESSMENT — ENCOUNTER SYMPTOMS
RESPIRATORY NEGATIVE: 1
CONSTIPATION: 0
ABDOMINAL DISTENTION: 0
DIARRHEA: 0
NAUSEA: 0
VOMITING: 0
BLOOD IN STOOL: 0
ABDOMINAL PAIN: 0
EYES NEGATIVE: 1
ANAL BLEEDING: 0
ALLERGIC/IMMUNOLOGIC NEGATIVE: 1
RECTAL PAIN: 0

## 2018-03-15 NOTE — PROGRESS NOTES
Years: 17.00     Types: Cigarettes    Smokeless tobacco: Never Used    Alcohol use 0.0 oz/week      Comment: rare social    Drug use: No    Sexual activity: Yes     Partners: Male     Other Topics Concern    Not on file     Social History Narrative    No narrative on file     Family History   Problem Relation Age of Onset    Cancer Maternal Aunt     Cancer Paternal Uncle     Cancer Maternal Grandfather     Cancer Paternal Grandfather     No Known Problems Paternal Grandmother     No Known Problems Maternal Grandmother     Other Father      tourettes    Other Mother      diverticulosis    No Known Problems Brother     No Known Problems Other     Breast Cancer Neg Hx     Colon Cancer Neg Hx     Diabetes Neg Hx     Eclampsia Neg Hx     Hypertension Neg Hx     Ovarian Cancer Neg Hx      Labor Neg Hx     Spont Abortions Neg Hx     Stroke Neg Hx        Review of Systems   Constitutional: Negative. Negative for activity change, appetite change, chills, diaphoresis, fatigue, fever and unexpected weight change. HENT: Negative. Eyes: Negative. Respiratory: Negative. Cardiovascular: Negative. Gastrointestinal: Negative for abdominal distention, abdominal pain, anal bleeding, blood in stool, constipation, diarrhea, nausea, rectal pain and vomiting. Endocrine: Negative. Genitourinary: Positive for pelvic pain. Negative for decreased urine volume, difficulty urinating, dyspareunia, dysuria, enuresis, flank pain, frequency, genital sores, hematuria, menstrual problem, urgency, vaginal bleeding, vaginal discharge and vaginal pain. Musculoskeletal: Negative. Skin: Negative. Allergic/Immunologic: Negative. Neurological: Negative. Hematological: Negative. Psychiatric/Behavioral: Negative. Objective:     Physical Exam   Constitutional: She is oriented to person, place, and time. She appears well-developed and well-nourished. No distress.    HENT:   Head: Normocephalic and atraumatic. Eyes: Conjunctivae are normal.   Neck: Normal range of motion. Neck supple. Cardiovascular: Normal rate and regular rhythm. Pulmonary/Chest: Effort normal. No respiratory distress. Genitourinary:   Genitourinary Comments: SSE without abnormal vaginal discharge. No cervical or vaginal lesions. Normal external genitalia. Musculoskeletal: Normal range of motion. She exhibits no edema, tenderness or deformity. Neurological: She is alert and oriented to person, place, and time. She exhibits normal muscle tone. Coordination normal.   Skin: Skin is warm and dry. She is not diaphoretic. No pallor. Psychiatric: She has a normal mood and affect. Her behavior is normal. Judgment and thought content normal.       Assessment:      1. Postop check     2. Dyspareunia, female  US Pelvis Complete    US Non OB Transvaginal   3. Late menses  POCT urine pregnancy         Plan:      Medications placed this encounter:  No orders of the defined types were placed in this encounter. Orders placed this encounter:  Orders Placed This Encounter   Procedures    US Pelvis Complete     Standing Status:   Future     Standing Expiration Date:   3/15/2019    US Non OB Transvaginal     Standing Status:   Future     Standing Expiration Date:   3/15/2019    POCT urine pregnancy         Follow up:  Return in about 3 months (around 6/15/2018) for Repeat Pap, Ultrasound, Results Review.

## 2018-03-20 ENCOUNTER — HOSPITAL ENCOUNTER (OUTPATIENT)
Dept: ULTRASOUND IMAGING | Age: 28
Discharge: HOME | End: 2018-03-22
Payer: COMMERCIAL

## 2018-03-20 DIAGNOSIS — N94.10 DYSPAREUNIA, FEMALE: ICD-10-CM

## 2018-03-20 PROCEDURE — 76856 US EXAM PELVIC COMPLETE: CPT

## 2018-03-20 PROCEDURE — 76830 TRANSVAGINAL US NON-OB: CPT

## 2018-03-27 ENCOUNTER — OFFICE VISIT (OUTPATIENT)
Dept: OBGYN CLINIC | Age: 28
End: 2018-03-27
Payer: COMMERCIAL

## 2018-03-27 VITALS
BODY MASS INDEX: 28.7 KG/M2 | WEIGHT: 162 LBS | DIASTOLIC BLOOD PRESSURE: 78 MMHG | SYSTOLIC BLOOD PRESSURE: 124 MMHG | HEIGHT: 63 IN

## 2018-03-27 DIAGNOSIS — Z79.899 MEDICATION MANAGEMENT: Primary | ICD-10-CM

## 2018-03-27 PROCEDURE — 4004F PT TOBACCO SCREEN RCVD TLK: CPT | Performed by: OBSTETRICS & GYNECOLOGY

## 2018-03-27 PROCEDURE — G8427 DOCREV CUR MEDS BY ELIG CLIN: HCPCS | Performed by: OBSTETRICS & GYNECOLOGY

## 2018-03-27 PROCEDURE — G8417 CALC BMI ABV UP PARAM F/U: HCPCS | Performed by: OBSTETRICS & GYNECOLOGY

## 2018-03-27 PROCEDURE — G8484 FLU IMMUNIZE NO ADMIN: HCPCS | Performed by: OBSTETRICS & GYNECOLOGY

## 2018-03-27 PROCEDURE — 99213 OFFICE O/P EST LOW 20 MIN: CPT | Performed by: OBSTETRICS & GYNECOLOGY

## 2018-03-27 ASSESSMENT — ENCOUNTER SYMPTOMS
ALLERGIC/IMMUNOLOGIC NEGATIVE: 1
RECTAL PAIN: 0
DIARRHEA: 0
ABDOMINAL DISTENTION: 0
NAUSEA: 0
EYES NEGATIVE: 1
RESPIRATORY NEGATIVE: 1
BLOOD IN STOOL: 0
VOMITING: 0
CONSTIPATION: 0
ABDOMINAL PAIN: 0
ANAL BLEEDING: 0

## 2018-03-27 NOTE — PROGRESS NOTES
Types: Cigarettes    Smokeless tobacco: Never Used    Alcohol use 0.0 oz/week      Comment: rare social    Drug use: No    Sexual activity: Yes     Partners: Male     Birth control/ protection: Surgical     Other Topics Concern    Not on file     Social History Narrative    No narrative on file     Family History   Problem Relation Age of Onset    Cancer Maternal Aunt     Cancer Paternal Uncle     Cancer Maternal Grandfather     Cancer Paternal Grandfather     No Known Problems Paternal Grandmother     No Known Problems Maternal Grandmother     Other Father      tourettes    Other Mother      diverticulosis    No Known Problems Brother     No Known Problems Other     Breast Cancer Neg Hx     Colon Cancer Neg Hx     Diabetes Neg Hx     Eclampsia Neg Hx     Hypertension Neg Hx     Ovarian Cancer Neg Hx      Labor Neg Hx     Spont Abortions Neg Hx     Stroke Neg Hx        Review of Systems   Constitutional: Negative. Negative for activity change, appetite change, chills, diaphoresis, fatigue, fever and unexpected weight change. HENT: Negative. Eyes: Negative. Respiratory: Negative. Cardiovascular: Negative. Gastrointestinal: Negative for abdominal distention, abdominal pain, anal bleeding, blood in stool, constipation, diarrhea, nausea, rectal pain and vomiting. Endocrine: Negative. Genitourinary: Negative for decreased urine volume, difficulty urinating, dyspareunia, dysuria, enuresis, flank pain, frequency, genital sores, hematuria, menstrual problem, pelvic pain, urgency, vaginal bleeding, vaginal discharge and vaginal pain. Musculoskeletal: Negative. Skin: Negative. Allergic/Immunologic: Negative. Neurological: Negative. Hematological: Negative. Psychiatric/Behavioral: Negative. Objective:     Physical Exam   Constitutional: She is oriented to person, place, and time. She appears well-developed and well-nourished. No distress.    HENT:

## 2018-03-30 ENCOUNTER — OFFICE VISIT (OUTPATIENT)
Dept: PRIMARY CARE CLINIC | Age: 28
End: 2018-03-30
Payer: COMMERCIAL

## 2018-03-30 VITALS
TEMPERATURE: 97.6 F | RESPIRATION RATE: 16 BRPM | OXYGEN SATURATION: 98 % | HEIGHT: 63 IN | SYSTOLIC BLOOD PRESSURE: 108 MMHG | WEIGHT: 162.2 LBS | HEART RATE: 74 BPM | DIASTOLIC BLOOD PRESSURE: 72 MMHG | BODY MASS INDEX: 28.74 KG/M2

## 2018-03-30 DIAGNOSIS — H66.93 BILATERAL OTITIS MEDIA, UNSPECIFIED OTITIS MEDIA TYPE: Primary | ICD-10-CM

## 2018-03-30 DIAGNOSIS — R42 VERTIGO: ICD-10-CM

## 2018-03-30 DIAGNOSIS — R68.89 FLU-LIKE SYMPTOMS: ICD-10-CM

## 2018-03-30 DIAGNOSIS — F90.0 ATTENTION DEFICIT HYPERACTIVITY DISORDER (ADHD), PREDOMINANTLY INATTENTIVE TYPE: ICD-10-CM

## 2018-03-30 PROCEDURE — 99213 OFFICE O/P EST LOW 20 MIN: CPT | Performed by: FAMILY MEDICINE

## 2018-03-30 PROCEDURE — G8484 FLU IMMUNIZE NO ADMIN: HCPCS | Performed by: FAMILY MEDICINE

## 2018-03-30 PROCEDURE — G8417 CALC BMI ABV UP PARAM F/U: HCPCS | Performed by: FAMILY MEDICINE

## 2018-03-30 PROCEDURE — G8427 DOCREV CUR MEDS BY ELIG CLIN: HCPCS | Performed by: FAMILY MEDICINE

## 2018-03-30 PROCEDURE — 4004F PT TOBACCO SCREEN RCVD TLK: CPT | Performed by: FAMILY MEDICINE

## 2018-03-30 PROCEDURE — 96372 THER/PROPH/DIAG INJ SC/IM: CPT | Performed by: FAMILY MEDICINE

## 2018-03-30 RX ORDER — CEFDINIR 300 MG/1
300 CAPSULE ORAL 2 TIMES DAILY
Qty: 20 CAPSULE | Refills: 0 | Status: SHIPPED | OUTPATIENT
Start: 2018-03-30 | End: 2018-04-09

## 2018-03-30 RX ORDER — CEFTRIAXONE 1 G/1
1 INJECTION, POWDER, FOR SOLUTION INTRAMUSCULAR; INTRAVENOUS ONCE
Status: COMPLETED | OUTPATIENT
Start: 2018-03-30 | End: 2018-03-30

## 2018-03-30 RX ORDER — MECLIZINE HYDROCHLORIDE 25 MG/1
25 TABLET ORAL 3 TIMES DAILY PRN
Qty: 30 TABLET | Refills: 0 | Status: SHIPPED | OUTPATIENT
Start: 2018-03-30 | End: 2018-04-09

## 2018-03-30 RX ORDER — OSELTAMIVIR PHOSPHATE 75 MG/1
75 CAPSULE ORAL 2 TIMES DAILY
Qty: 10 CAPSULE | Refills: 0 | Status: SHIPPED | OUTPATIENT
Start: 2018-03-30 | End: 2018-04-04

## 2018-03-30 RX ADMIN — CEFTRIAXONE 1 G: 1 INJECTION, POWDER, FOR SOLUTION INTRAMUSCULAR; INTRAVENOUS at 10:05

## 2018-03-30 ASSESSMENT — ENCOUNTER SYMPTOMS
CHANGE IN BOWEL HABIT: 1
ABDOMINAL PAIN: 0
DIARRHEA: 1
SORE THROAT: 0
NAUSEA: 1
SWOLLEN GLANDS: 0
COUGH: 0
VOMITING: 1

## 2018-03-30 NOTE — PROGRESS NOTES
Subjective:      Patient ID: Jenni Palacio is a 28 y.o. female who presents today for:  Chief Complaint   Patient presents with    Follow-Up from Hospital     Pt was seen at [facility] ED on 2/28/18 for dizziness. Pt had and EKG, chest xray, strep and flu test. Pt did not get any results and had to leave to  her daughter. Pt has been dizzy for 4 days. Pt admits to nausea, vomiting, diarrhea, chills, headache. Pt denies any congestion, chest pain, SOB. Pt took zofran with much relief. Ekg and chest xray are in chart. Dizziness   This is a new problem. The current episode started in the past 7 days. The problem occurs constantly. The problem has been unchanged. Associated symptoms include a change in bowel habit, chills, headaches, nausea, vertigo and vomiting. Pertinent negatives include no abdominal pain, anorexia, arthralgias, chest pain, congestion, coughing, diaphoresis, fatigue, fever, neck pain, numbness, rash, sore throat, swollen glands or urinary symptoms. Nothing aggravates the symptoms. Treatments tried: zofran. The treatment provided moderate relief.        Past Medical History:   Diagnosis Date    ADHD (attention deficit hyperactivity disorder)     Anxiety     HGSIL (high grade squamous intraepithelial lesion) on Pap smear of cervix 12/19/2017     Past Surgical History:   Procedure Laterality Date    LEEP N/A 12/20/2017    LEEP performed by Collins Vasquez MD at [address] History   Problem Relation Age of Onset    Cancer Maternal Aunt     Cancer Paternal Uncle     Cancer Maternal Grandfather     Cancer Paternal Grandfather     No Known Problems Paternal Grandmother     No Known Problems Maternal Grandmother     Other Father      tourettes    Other Mother      diverticulosis    No Known Problems Brother     No Known Problems Other     Breast Cancer Neg Hx     Colon Cancer Neg Hx     Diabetes Neg Hx     Eclampsia Neg Hx     Hypertension Neg Hx     Ovarian Cancer

## 2018-04-04 ENCOUNTER — OFFICE VISIT (OUTPATIENT)
Dept: OBGYN CLINIC | Age: 28
End: 2018-04-04
Payer: COMMERCIAL

## 2018-04-04 VITALS
SYSTOLIC BLOOD PRESSURE: 120 MMHG | BODY MASS INDEX: 29.06 KG/M2 | HEIGHT: 63 IN | DIASTOLIC BLOOD PRESSURE: 78 MMHG | WEIGHT: 164 LBS

## 2018-04-04 DIAGNOSIS — Z30.46 NEXPLANON REMOVAL: Primary | ICD-10-CM

## 2018-04-04 PROCEDURE — 11982 REMOVE DRUG IMPLANT DEVICE: CPT | Performed by: OBSTETRICS & GYNECOLOGY

## 2018-04-04 PROCEDURE — 99999 PR OFFICE/OUTPT VISIT,PROCEDURE ONLY: CPT | Performed by: OBSTETRICS & GYNECOLOGY

## 2018-04-04 ASSESSMENT — ENCOUNTER SYMPTOMS
BLOOD IN STOOL: 0
DIARRHEA: 0
ANAL BLEEDING: 0
NAUSEA: 0
ABDOMINAL DISTENTION: 0
ABDOMINAL PAIN: 0
VOMITING: 0
RESPIRATORY NEGATIVE: 1
EYES NEGATIVE: 1
CONSTIPATION: 0
ALLERGIC/IMMUNOLOGIC NEGATIVE: 1
RECTAL PAIN: 0

## 2018-05-02 ENCOUNTER — OFFICE VISIT (OUTPATIENT)
Dept: FAMILY MEDICINE CLINIC | Age: 28
End: 2018-05-02
Payer: COMMERCIAL

## 2018-05-02 VITALS
OXYGEN SATURATION: 98 % | HEART RATE: 71 BPM | SYSTOLIC BLOOD PRESSURE: 98 MMHG | RESPIRATION RATE: 20 BRPM | TEMPERATURE: 97.7 F | WEIGHT: 163.6 LBS | HEIGHT: 63 IN | BODY MASS INDEX: 28.99 KG/M2 | DIASTOLIC BLOOD PRESSURE: 70 MMHG

## 2018-05-02 DIAGNOSIS — R22.0 FACIAL SWELLING: Primary | ICD-10-CM

## 2018-05-02 DIAGNOSIS — K08.89 TOOTH PAIN: ICD-10-CM

## 2018-05-02 PROCEDURE — G8427 DOCREV CUR MEDS BY ELIG CLIN: HCPCS | Performed by: NURSE PRACTITIONER

## 2018-05-02 PROCEDURE — G8417 CALC BMI ABV UP PARAM F/U: HCPCS | Performed by: NURSE PRACTITIONER

## 2018-05-02 PROCEDURE — 99213 OFFICE O/P EST LOW 20 MIN: CPT | Performed by: NURSE PRACTITIONER

## 2018-05-02 PROCEDURE — 4004F PT TOBACCO SCREEN RCVD TLK: CPT | Performed by: NURSE PRACTITIONER

## 2018-05-02 ASSESSMENT — ENCOUNTER SYMPTOMS: FACIAL SWELLING: 1

## 2018-05-21 ENCOUNTER — OFFICE VISIT (OUTPATIENT)
Dept: PRIMARY CARE CLINIC | Age: 28
End: 2018-05-21
Payer: COMMERCIAL

## 2018-05-21 VITALS
DIASTOLIC BLOOD PRESSURE: 70 MMHG | WEIGHT: 162 LBS | SYSTOLIC BLOOD PRESSURE: 102 MMHG | BODY MASS INDEX: 28.7 KG/M2 | RESPIRATION RATE: 14 BRPM | TEMPERATURE: 98.3 F | HEART RATE: 80 BPM | HEIGHT: 63 IN | OXYGEN SATURATION: 100 %

## 2018-05-21 DIAGNOSIS — F90.0 ATTENTION DEFICIT HYPERACTIVITY DISORDER (ADHD), PREDOMINANTLY INATTENTIVE TYPE: Primary | ICD-10-CM

## 2018-05-21 DIAGNOSIS — Z72.0 NICOTINE ABUSE: ICD-10-CM

## 2018-05-21 DIAGNOSIS — E78.00 HYPERCHOLESTEREMIA: ICD-10-CM

## 2018-05-21 DIAGNOSIS — F90.0 ATTENTION DEFICIT HYPERACTIVITY DISORDER (ADHD), PREDOMINANTLY INATTENTIVE TYPE: ICD-10-CM

## 2018-05-21 PROCEDURE — 99213 OFFICE O/P EST LOW 20 MIN: CPT | Performed by: FAMILY MEDICINE

## 2018-05-21 PROCEDURE — G8417 CALC BMI ABV UP PARAM F/U: HCPCS | Performed by: FAMILY MEDICINE

## 2018-05-21 PROCEDURE — 4004F PT TOBACCO SCREEN RCVD TLK: CPT | Performed by: FAMILY MEDICINE

## 2018-05-21 PROCEDURE — G8427 DOCREV CUR MEDS BY ELIG CLIN: HCPCS | Performed by: FAMILY MEDICINE

## 2018-05-21 RX ORDER — DEXTROAMPHETAMINE SACCHARATE, AMPHETAMINE ASPARTATE MONOHYDRATE, DEXTROAMPHETAMINE SULFATE AND AMPHETAMINE SULFATE 5; 5; 5; 5 MG/1; MG/1; MG/1; MG/1
20 CAPSULE, EXTENDED RELEASE ORAL EVERY MORNING
Qty: 30 CAPSULE | Refills: 0 | Status: SHIPPED | OUTPATIENT
Start: 2018-05-21 | End: 2019-07-29

## 2018-05-21 RX ORDER — DEXTROAMPHETAMINE SACCHARATE, AMPHETAMINE ASPARTATE MONOHYDRATE, DEXTROAMPHETAMINE SULFATE AND AMPHETAMINE SULFATE 5; 5; 5; 5 MG/1; MG/1; MG/1; MG/1
20 CAPSULE, EXTENDED RELEASE ORAL EVERY MORNING
Qty: 30 CAPSULE | Refills: 0 | Status: SHIPPED | OUTPATIENT
Start: 2018-07-18 | End: 2019-07-29

## 2018-05-21 RX ORDER — DEXTROAMPHETAMINE SACCHARATE, AMPHETAMINE ASPARTATE MONOHYDRATE, DEXTROAMPHETAMINE SULFATE AND AMPHETAMINE SULFATE 5; 5; 5; 5 MG/1; MG/1; MG/1; MG/1
20 CAPSULE, EXTENDED RELEASE ORAL EVERY MORNING
Qty: 30 CAPSULE | Refills: 0 | Status: SHIPPED | OUTPATIENT
Start: 2018-06-19 | End: 2019-07-29

## 2018-05-21 ASSESSMENT — ENCOUNTER SYMPTOMS
CONSTIPATION: 0
EYES NEGATIVE: 1
PHOTOPHOBIA: 0
ABDOMINAL PAIN: 0
EYE REDNESS: 0
RESPIRATORY NEGATIVE: 1
SHORTNESS OF BREATH: 0
EYE ITCHING: 0
BACK PAIN: 0
DIARRHEA: 0
GASTROINTESTINAL NEGATIVE: 1
WHEEZING: 0

## 2018-05-24 LAB
6-ACETYLMORPHINE: NOT DETECTED
7-AMINOCLONAZEPAM: NOT DETECTED
ALPHA-OH-ALPRAZOLAM: NOT DETECTED
ALPRAZOLAM: NOT DETECTED
AMPHETAMINE: NOT DETECTED
BARBITURATES: NOT DETECTED
BENZOYLECGONINE: PRESENT
BUPRENORPHINE: NOT DETECTED
CARISOPRODOL: NOT DETECTED
CLONAZEPAM: NOT DETECTED
CODEINE: NOT DETECTED
CREATININE URINE: 143.8 MG/DL (ref 20–400)
DIAZEPAM: NOT DETECTED
EER PAIN MGT DRUG PANEL, HIGH RES/EMIT U: NORMAL
ETHYL GLUCURONIDE: PRESENT
FENTANYL: NOT DETECTED
HYDROCODONE: NOT DETECTED
HYDROMORPHONE: NOT DETECTED
LORAZEPAM: NOT DETECTED
MARIJUANA METABOLITE: PRESENT
MDA: NOT DETECTED
MDEA: NOT DETECTED
MDMA URINE: NOT DETECTED
MEPERIDINE: NOT DETECTED
METHADONE: NOT DETECTED
METHAMPHETAMINE: NOT DETECTED
METHYLPHENIDATE: NOT DETECTED
MIDAZOLAM: NOT DETECTED
MORPHINE: NOT DETECTED
NORBUPRENORPHINE, FREE: NOT DETECTED
NORDIAZEPAM: NOT DETECTED
NORFENTANYL: NOT DETECTED
NORHYDROCODONE, URINE: NOT DETECTED
NOROXYCODONE: NOT DETECTED
NOROXYMORPHONE, URINE: NOT DETECTED
OXAZEPAM: NOT DETECTED
OXYCODONE: NOT DETECTED
OXYMORPHONE: NOT DETECTED
PAIN MANAGEMENT DRUG PANEL: NORMAL
PCP: NOT DETECTED
PHENTERMINE: NOT DETECTED
PROPOXYPHENE: NOT DETECTED
TAPENTADOL, URINE: NOT DETECTED
TAPENTADOL-O-SULFATE, URINE: NOT DETECTED
TEMAZEPAM: NOT DETECTED
TRAMADOL: NOT DETECTED
ZOLPIDEM: NOT DETECTED

## 2019-04-08 ENCOUNTER — TELEPHONE (OUTPATIENT)
Dept: OBGYN CLINIC | Age: 29
End: 2019-04-08

## 2019-04-08 NOTE — TELEPHONE ENCOUNTER
Pt had ob appt 5-13-19 and cxld because she though that she miscarried. She went to [facility] 4-5-19 and was told that she is still pregnant but was seen  For a threatened miscarriage.  She was told to call office to be seen asap  Please call her back ASAP [phone] she said that she has to go to work  But needs to speak to us first

## 2019-04-08 NOTE — TELEPHONE ENCOUNTER
Spoke with pt in regards to complaints. Pt to come in today for rpt HCG for threatened miscarriage. Pt reports light spotting only when she uses the bathroom. Gave pt bleeding precautions and she is aware if bleeding changed to return to ER.

## 2019-06-06 ENCOUNTER — OFFICE VISIT (OUTPATIENT)
Dept: OBGYN CLINIC | Age: 29
End: 2019-06-06
Payer: COMMERCIAL

## 2019-06-06 VITALS
DIASTOLIC BLOOD PRESSURE: 80 MMHG | SYSTOLIC BLOOD PRESSURE: 102 MMHG | HEIGHT: 63 IN | BODY MASS INDEX: 29.95 KG/M2 | WEIGHT: 169 LBS

## 2019-06-06 DIAGNOSIS — O03.9 FOLLOW-UP VISIT AFTER MISCARRIAGE: Primary | ICD-10-CM

## 2019-06-06 DIAGNOSIS — Z51.89 FOLLOW-UP VISIT AFTER MISCARRIAGE: Primary | ICD-10-CM

## 2019-06-06 PROCEDURE — G8428 CUR MEDS NOT DOCUMENT: HCPCS | Performed by: OBSTETRICS & GYNECOLOGY

## 2019-06-06 PROCEDURE — 4004F PT TOBACCO SCREEN RCVD TLK: CPT | Performed by: OBSTETRICS & GYNECOLOGY

## 2019-06-06 PROCEDURE — 99212 OFFICE O/P EST SF 10 MIN: CPT | Performed by: OBSTETRICS & GYNECOLOGY

## 2019-06-06 PROCEDURE — G8419 CALC BMI OUT NRM PARAM NOF/U: HCPCS | Performed by: OBSTETRICS & GYNECOLOGY

## 2019-06-06 RX ORDER — ASCORBIC ACID, CHOLECALCIFEROL, .ALPHA.-TOCOPHEROL ACETATE, DL-, PYRIDOXINE HYDROCHLORIDE, FOLIC ACID, CYANOCOBALAMIN, BIOTIN, CALCIUM CARBONATE, FERROUS ASPARTO GLYCINATE, IRON, POTASSIUM IODIDE, MAGNESIUM OXIDE, DOCONEXENT AND LOWBUSH BLUEBERRY 60; 1000; 10; 26; 400; 13; 280; 80; 9; 9; 150; 25; 350; 25; 600 MG/1; [IU]/1; [IU]/1; MG/1; UG/1; UG/1; UG/1; MG/1; MG/1; MG/1; UG/1; MG/1; MG/1; MG/1; UG/1
1 CAPSULE, GELATIN COATED ORAL DAILY
Qty: 90 CAPSULE | Refills: 4 | Status: SHIPPED | OUTPATIENT
Start: 2019-06-06 | End: 2019-07-19 | Stop reason: SDUPTHER

## 2019-07-08 ENCOUNTER — OFFICE VISIT (OUTPATIENT)
Dept: OBGYN CLINIC | Age: 29
End: 2019-07-08
Payer: COMMERCIAL

## 2019-07-08 VITALS
WEIGHT: 171 LBS | HEIGHT: 63 IN | BODY MASS INDEX: 30.3 KG/M2 | DIASTOLIC BLOOD PRESSURE: 74 MMHG | SYSTOLIC BLOOD PRESSURE: 110 MMHG

## 2019-07-08 DIAGNOSIS — O20.0 THREATENED ABORTION IN EARLY PREGNANCY: Primary | ICD-10-CM

## 2019-07-08 DIAGNOSIS — O20.0 THREATENED ABORTION IN EARLY PREGNANCY: ICD-10-CM

## 2019-07-08 LAB — GONADOTROPIN, CHORIONIC (HCG) QUANT: 1185 MIU/ML

## 2019-07-08 PROCEDURE — G8417 CALC BMI ABV UP PARAM F/U: HCPCS | Performed by: OBSTETRICS & GYNECOLOGY

## 2019-07-08 PROCEDURE — 99213 OFFICE O/P EST LOW 20 MIN: CPT | Performed by: OBSTETRICS & GYNECOLOGY

## 2019-07-08 PROCEDURE — 4004F PT TOBACCO SCREEN RCVD TLK: CPT | Performed by: OBSTETRICS & GYNECOLOGY

## 2019-07-08 PROCEDURE — G8427 DOCREV CUR MEDS BY ELIG CLIN: HCPCS | Performed by: OBSTETRICS & GYNECOLOGY

## 2019-07-08 NOTE — PROGRESS NOTES
SUBJECTIVE:   33 y.o. [id number]  female here to discuss positive pregnancy test. Pt seen at [address] [place] [address] with VB with pregnancy. PT with hcg 665 and US without adnexal masses. PT states VB has resolved. PT with recent SAB and no menses prior to this pregnancy. PT with h/o TSVD x 1. Review of Systems:  General ROS: negative  Respiratory ROS: no cough, shortness of breath, or wheezing  Cardiovascular ROS: no chest pain or dyspnea on exertion  Gastrointestinal ROS: no abdominal pain, change in bowel habits, or black or bloody stools  Genito-Urinary ROS: no dysuria, trouble voiding, or hematuria    OBJECTIVE:   /74   Ht 5' 3\" (1.6 m)   Wt 171 lb (77.6 kg)   LMP 06/04/2019   BMI 30.29 kg/m²     Physical Exam:  GEN: She appears well, afebrile. HEENT: normal cephalic, atraumatic  CVS: regular rate and rhythm  ABDOMEN: benign, soft, nontender, no masses. MUSCULOSKELETAL: normal gait, no masses  SKIN: normal texture and tone, no lesions    ASSESSMENT:   Threatened miscarriage    PLAN:   Hcg pending  If hcg higher than 2000 will order US to confirm IUP.

## 2019-07-09 ENCOUNTER — TELEPHONE (OUTPATIENT)
Dept: OBGYN CLINIC | Age: 29
End: 2019-07-09

## 2019-07-09 DIAGNOSIS — Z32.01 POSITIVE URINE PREGNANCY TEST: Primary | ICD-10-CM

## 2019-07-09 NOTE — TELEPHONE ENCOUNTER
----- Message from Elizabeth Weaver MD sent at 7/8/2019 11:34 PM EDT -----  Pt with increasing hcg level, pt should have repeat hcg in 1wk

## 2019-07-15 DIAGNOSIS — Z32.01 POSITIVE URINE PREGNANCY TEST: ICD-10-CM

## 2019-07-15 LAB — GONADOTROPIN, CHORIONIC (HCG) QUANT: NORMAL MIU/ML

## 2019-07-16 ENCOUNTER — TELEPHONE (OUTPATIENT)
Dept: OBGYN CLINIC | Age: 29
End: 2019-07-16

## 2019-07-16 DIAGNOSIS — Z32.01 PREGNANCY TEST POSITIVE: Primary | ICD-10-CM

## 2019-07-17 ENCOUNTER — TELEPHONE (OUTPATIENT)
Dept: OBGYN CLINIC | Age: 29
End: 2019-07-17

## 2019-07-17 ENCOUNTER — HOSPITAL ENCOUNTER (OUTPATIENT)
Dept: ULTRASOUND IMAGING | Age: 29
Discharge: HOME | End: 2019-07-19
Payer: COMMERCIAL

## 2019-07-17 DIAGNOSIS — Z32.01 PREGNANCY TEST POSITIVE: ICD-10-CM

## 2019-07-17 PROCEDURE — 76817 TRANSVAGINAL US OBSTETRIC: CPT

## 2019-07-17 PROCEDURE — 76801 OB US < 14 WKS SINGLE FETUS: CPT

## 2019-07-17 NOTE — TELEPHONE ENCOUNTER
Please see previous phone message and advise. Pt is scheduled for confirmation of pregnancy 7-29-19.

## 2019-07-18 NOTE — TELEPHONE ENCOUNTER
Pregnancy is not a disability and pt can work in hot environment and she should focus on staying well hydrated.

## 2019-07-22 ENCOUNTER — TELEPHONE (OUTPATIENT)
Dept: OBGYN CLINIC | Age: 29
End: 2019-07-22

## 2019-07-22 DIAGNOSIS — Z32.01 PREGNANCY EXAMINATION OR TEST, POSITIVE RESULT: Primary | ICD-10-CM

## 2019-07-22 DIAGNOSIS — O20.0 THREATENED ABORTION IN EARLY PREGNANCY: ICD-10-CM

## 2019-07-22 RX ORDER — ASCORBIC ACID, CHOLECALCIFEROL, .ALPHA.-TOCOPHEROL ACETATE, DL-, PYRIDOXINE HYDROCHLORIDE, FOLIC ACID, CYANOCOBALAMIN, BIOTIN, CALCIUM CARBONATE, FERROUS ASPARTO GLYCINATE, IRON, POTASSIUM IODIDE, MAGNESIUM OXIDE, DOCONEXENT AND LOWBUSH BLUEBERRY 60; 1000; 10; 26; 400; 13; 280; 80; 9; 9; 150; 25; 350; 25; 600 MG/1; [IU]/1; [IU]/1; MG/1; UG/1; UG/1; UG/1; MG/1; MG/1; MG/1; UG/1; MG/1; MG/1; MG/1; UG/1
1 CAPSULE, GELATIN COATED ORAL DAILY
Qty: 90 CAPSULE | Refills: 5 | Status: SHIPPED | OUTPATIENT
Start: 2019-07-22

## 2019-07-22 NOTE — TELEPHONE ENCOUNTER
Unsure if you would like pt to have another US done prior to her appt with you 7/29, US done on 7/17 did not note +CM due to small fetal pole. HCG has increased. Initial HCG 1185 (7/8/19) rpt HCG 53742 (7/15/19) please advise, order pended in necessary.  Thank you

## 2019-07-29 ENCOUNTER — OFFICE VISIT (OUTPATIENT)
Dept: OBGYN CLINIC | Age: 29
End: 2019-07-29
Payer: COMMERCIAL

## 2019-07-29 VITALS — BODY MASS INDEX: 30.65 KG/M2 | WEIGHT: 173 LBS | SYSTOLIC BLOOD PRESSURE: 98 MMHG | DIASTOLIC BLOOD PRESSURE: 62 MMHG

## 2019-07-29 DIAGNOSIS — Z32.01 POSITIVE PREGNANCY TEST: Primary | ICD-10-CM

## 2019-07-29 DIAGNOSIS — Z32.01 POSITIVE PREGNANCY TEST: ICD-10-CM

## 2019-07-29 DIAGNOSIS — Z32.01 POSITIVE URINE PREGNANCY TEST: ICD-10-CM

## 2019-07-29 LAB
BACTERIA: ABNORMAL /HPF
BILIRUBIN URINE: NEGATIVE
BLOOD, URINE: NEGATIVE
CLARITY: CLEAR
COLOR: YELLOW
EPITHELIAL CELLS, UA: ABNORMAL /HPF (ref 0–5)
GLUCOSE URINE: NEGATIVE MG/DL
HCG, URINE, POC: POSITIVE
HYALINE CASTS: ABNORMAL /HPF (ref 0–5)
KETONES, URINE: NEGATIVE MG/DL
LEUKOCYTE ESTERASE, URINE: ABNORMAL
Lab: NORMAL
NEGATIVE QC PASS/FAIL: NORMAL
NITRITE, URINE: NEGATIVE
PH UA: 6.5 (ref 5–9)
POSITIVE QC PASS/FAIL: NORMAL
PROTEIN UA: NEGATIVE MG/DL
RBC UA: ABNORMAL /HPF (ref 0–2)
SPECIFIC GRAVITY UA: 1.02 (ref 1–1.03)
UROBILINOGEN, URINE: 0.2 E.U./DL
WBC UA: ABNORMAL /HPF (ref 0–5)

## 2019-07-29 PROCEDURE — G8417 CALC BMI ABV UP PARAM F/U: HCPCS | Performed by: OBSTETRICS & GYNECOLOGY

## 2019-07-29 PROCEDURE — 4004F PT TOBACCO SCREEN RCVD TLK: CPT | Performed by: OBSTETRICS & GYNECOLOGY

## 2019-07-29 PROCEDURE — 81025 URINE PREGNANCY TEST: CPT | Performed by: OBSTETRICS & GYNECOLOGY

## 2019-07-29 PROCEDURE — 99213 OFFICE O/P EST LOW 20 MIN: CPT | Performed by: OBSTETRICS & GYNECOLOGY

## 2019-07-29 PROCEDURE — G8427 DOCREV CUR MEDS BY ELIG CLIN: HCPCS | Performed by: OBSTETRICS & GYNECOLOGY

## 2019-07-29 RX ORDER — ONDANSETRON 4 MG/1
4 TABLET, ORALLY DISINTEGRATING ORAL 3 TIMES DAILY PRN
Qty: 30 TABLET | Refills: 0 | Status: SHIPPED | OUTPATIENT
Start: 2019-07-29 | End: 2019-08-27 | Stop reason: SDUPTHER

## 2019-08-01 LAB
SPECIMEN SOURCE: NORMAL
T. VAGINALIS AMPLIFIED: NEGATIVE

## 2019-08-02 ENCOUNTER — HOSPITAL ENCOUNTER (OUTPATIENT)
Dept: ULTRASOUND IMAGING | Age: 29
Discharge: HOME | End: 2019-08-04
Payer: COMMERCIAL

## 2019-08-02 DIAGNOSIS — O20.0 THREATENED ABORTION IN EARLY PREGNANCY: ICD-10-CM

## 2019-08-02 DIAGNOSIS — Z32.01 PREGNANCY EXAMINATION OR TEST, POSITIVE RESULT: ICD-10-CM

## 2019-08-02 DIAGNOSIS — Z32.01 POSITIVE PREGNANCY TEST: ICD-10-CM

## 2019-08-02 PROCEDURE — 76801 OB US < 14 WKS SINGLE FETUS: CPT

## 2019-08-06 LAB
C. TRACHOMATIS DNA ,URINE: NEGATIVE
N. GONORRHOEAE DNA, URINE: NEGATIVE

## 2019-08-22 ENCOUNTER — TELEPHONE (OUTPATIENT)
Dept: OBGYN CLINIC | Age: 29
End: 2019-08-22

## 2019-08-27 ENCOUNTER — INITIAL PRENATAL (OUTPATIENT)
Dept: OBGYN CLINIC | Age: 29
End: 2019-08-27
Payer: COMMERCIAL

## 2019-08-27 VITALS
SYSTOLIC BLOOD PRESSURE: 90 MMHG | WEIGHT: 170 LBS | HEART RATE: 100 BPM | BODY MASS INDEX: 30.11 KG/M2 | DIASTOLIC BLOOD PRESSURE: 70 MMHG

## 2019-08-27 DIAGNOSIS — Z3A.12 12 WEEKS GESTATION OF PREGNANCY: ICD-10-CM

## 2019-08-27 DIAGNOSIS — Z34.82 ENCOUNTER FOR SUPERVISION OF OTHER NORMAL PREGNANCY IN SECOND TRIMESTER: ICD-10-CM

## 2019-08-27 DIAGNOSIS — Z32.01 POSITIVE PREGNANCY TEST: ICD-10-CM

## 2019-08-27 DIAGNOSIS — Z34.82 ENCOUNTER FOR SUPERVISION OF OTHER NORMAL PREGNANCY IN SECOND TRIMESTER: Primary | ICD-10-CM

## 2019-08-27 PROCEDURE — 99214 OFFICE O/P EST MOD 30 MIN: CPT | Performed by: OBSTETRICS & GYNECOLOGY

## 2019-08-27 PROCEDURE — G8427 DOCREV CUR MEDS BY ELIG CLIN: HCPCS | Performed by: OBSTETRICS & GYNECOLOGY

## 2019-08-27 PROCEDURE — G8417 CALC BMI ABV UP PARAM F/U: HCPCS | Performed by: OBSTETRICS & GYNECOLOGY

## 2019-08-27 PROCEDURE — 4004F PT TOBACCO SCREEN RCVD TLK: CPT | Performed by: OBSTETRICS & GYNECOLOGY

## 2019-08-27 RX ORDER — ONDANSETRON 4 MG/1
4 TABLET, ORALLY DISINTEGRATING ORAL 3 TIMES DAILY PRN
Qty: 30 TABLET | Refills: 3 | Status: SHIPPED | OUTPATIENT
Start: 2019-08-27 | End: 2019-09-09 | Stop reason: SDUPTHER

## 2019-08-27 NOTE — PROGRESS NOTES
Reports she was seen [facility] ER for vaginal spotting and cramping, states she was told there is a enlarged yolk sac. Pt expresses concern.

## 2019-08-29 LAB — URINE CULTURE, ROUTINE: NORMAL

## 2019-08-30 LAB
HPV COMMENT: NORMAL
HPV TYPE 16: NOT DETECTED
HPV TYPE 18: NOT DETECTED
HPVOH (OTHER TYPES): NOT DETECTED

## 2019-09-09 RX ORDER — ONDANSETRON 4 MG/1
4 TABLET, ORALLY DISINTEGRATING ORAL 3 TIMES DAILY PRN
Qty: 30 TABLET | Refills: 3 | Status: SHIPPED | OUTPATIENT
Start: 2019-09-09 | End: 2019-10-07 | Stop reason: SDUPTHER

## 2019-09-24 ENCOUNTER — ROUTINE PRENATAL (OUTPATIENT)
Dept: OBGYN CLINIC | Age: 29
End: 2019-09-24
Payer: COMMERCIAL

## 2019-09-24 VITALS
HEART RATE: 90 BPM | SYSTOLIC BLOOD PRESSURE: 98 MMHG | WEIGHT: 178 LBS | DIASTOLIC BLOOD PRESSURE: 60 MMHG | BODY MASS INDEX: 31.53 KG/M2

## 2019-09-24 DIAGNOSIS — Z3A.15 15 WEEKS GESTATION OF PREGNANCY: ICD-10-CM

## 2019-09-24 DIAGNOSIS — Z34.82 ENCOUNTER FOR SUPERVISION OF OTHER NORMAL PREGNANCY IN SECOND TRIMESTER: ICD-10-CM

## 2019-09-24 DIAGNOSIS — Z34.82 ENCOUNTER FOR SUPERVISION OF OTHER NORMAL PREGNANCY IN SECOND TRIMESTER: Primary | ICD-10-CM

## 2019-09-24 DIAGNOSIS — R31.9 HEMATURIA, UNSPECIFIED TYPE: ICD-10-CM

## 2019-09-24 LAB
BASOPHILS ABSOLUTE: 0 K/UL (ref 0–0.2)
BASOPHILS RELATIVE PERCENT: 0.5 %
EOSINOPHILS ABSOLUTE: 0.1 K/UL (ref 0–0.7)
EOSINOPHILS RELATIVE PERCENT: 1.1 %
HCT VFR BLD CALC: 38.3 % (ref 37–47)
HEMOGLOBIN: 13.1 G/DL (ref 12–16)
HEPATITIS B SURFACE ANTIGEN INTERPRETATION: NORMAL
LYMPHOCYTES ABSOLUTE: 1.8 K/UL (ref 1–4.8)
LYMPHOCYTES RELATIVE PERCENT: 18.4 %
MCH RBC QN AUTO: 32.8 PG (ref 27–31.3)
MCHC RBC AUTO-ENTMCNC: 34.2 % (ref 33–37)
MCV RBC AUTO: 96.1 FL (ref 82–100)
MONOCYTES ABSOLUTE: 0.6 K/UL (ref 0.2–0.8)
MONOCYTES RELATIVE PERCENT: 6.3 %
NEUTROPHILS ABSOLUTE: 7.1 K/UL (ref 1.4–6.5)
NEUTROPHILS RELATIVE PERCENT: 73.7 %
PDW BLD-RTO: 12.9 % (ref 11.5–14.5)
PLATELET # BLD: 311 K/UL (ref 130–400)
RBC # BLD: 3.98 M/UL (ref 4.2–5.4)
RUBELLA ANTIBODY IGG: 31.5 IU/ML
WBC # BLD: 9.7 K/UL (ref 4.8–10.8)

## 2019-09-24 PROCEDURE — G8427 DOCREV CUR MEDS BY ELIG CLIN: HCPCS | Performed by: OBSTETRICS & GYNECOLOGY

## 2019-09-24 PROCEDURE — 4004F PT TOBACCO SCREEN RCVD TLK: CPT | Performed by: OBSTETRICS & GYNECOLOGY

## 2019-09-24 PROCEDURE — G8417 CALC BMI ABV UP PARAM F/U: HCPCS | Performed by: OBSTETRICS & GYNECOLOGY

## 2019-09-24 PROCEDURE — 99213 OFFICE O/P EST LOW 20 MIN: CPT | Performed by: OBSTETRICS & GYNECOLOGY

## 2019-09-24 ASSESSMENT — PATIENT HEALTH QUESTIONNAIRE - PHQ9
SUM OF ALL RESPONSES TO PHQ QUESTIONS 1-9: 0
SUM OF ALL RESPONSES TO PHQ QUESTIONS 1-9: 0
2. FEELING DOWN, DEPRESSED OR HOPELESS: 0
1. LITTLE INTEREST OR PLEASURE IN DOING THINGS: 0
SUM OF ALL RESPONSES TO PHQ9 QUESTIONS 1 & 2: 0

## 2019-09-25 LAB
ABO/RH: NORMAL
ANTIBODY SCREEN: NORMAL
RPR: NORMAL
URINE CULTURE, ROUTINE: NORMAL

## 2019-09-27 LAB
HIV 1,2 COMBO ANTIGEN/ANTIBODY: NEGATIVE
VZV IGG SER QL IA: 1051 IV

## 2019-09-28 LAB
AFP INTERPRETATION: NORMAL
AFP MOM: 0.7
AFP SPECIMEN: NORMAL
CYSTIC FIBROSIS 165 VARIANTS INTERP: NORMAL
CYSTIC FIBROSIS 5T VARIANT: NORMAL
CYSTIC FIBROSIS ALLELE 1: NEGATIVE
CYSTIC FIBROSIS ALLELE 2: NEGATIVE
DATING: NORMAL
ESTIMATED DUE DATE: NORMAL
FETUS COUNT: NORMAL
GESTATIONAL AGE CALC AT COLLECT: NORMAL
HISTORY/NEURAL TUBE DEFECTS: NO
INSULIN DEP. DIABETIC: NO
MATERNAL AGE AT EDD: 30.6 YR
MATERNAL WEIGHT: NORMAL
PT AFP: 21 NG/ML
RACE: NORMAL
SMOKING: YES

## 2019-10-01 LAB
EER NON INVASIVE PRENATAL ANEUPLOIDY: NORMAL
FETAL FRACTION: 7.9
FETAL GENDER: NORMAL
GESTATIONAL AGE (DAYS): 0
GESTATIONAL AGE(WEEKS): 16
Lab: NORMAL
MATERNAL WEIGHT: 178
MONOSOMY X: NORMAL
REPORT FETUS GENDER: YES
TRIPLOIDY (VANISHING TWIN): NORMAL
TRISOMY 13 RISK: NORMAL
TRISOMY 18 RISK ASSESSMENT: NORMAL
TRISOMY 21 RISK: NORMAL

## 2019-10-08 RX ORDER — ONDANSETRON 4 MG/1
4 TABLET, ORALLY DISINTEGRATING ORAL 3 TIMES DAILY PRN
Qty: 30 TABLET | Refills: 3 | Status: SHIPPED | OUTPATIENT
Start: 2019-10-08

## 2019-10-17 ENCOUNTER — HOSPITAL ENCOUNTER (OUTPATIENT)
Dept: ULTRASOUND IMAGING | Age: 29
Discharge: HOME | End: 2019-10-19
Payer: COMMERCIAL

## 2019-10-17 DIAGNOSIS — Z34.82 ENCOUNTER FOR SUPERVISION OF OTHER NORMAL PREGNANCY IN SECOND TRIMESTER: ICD-10-CM

## 2019-10-17 DIAGNOSIS — Z3A.15 15 WEEKS GESTATION OF PREGNANCY: ICD-10-CM

## 2019-10-17 PROCEDURE — 76805 OB US >/= 14 WKS SNGL FETUS: CPT

## 2019-10-22 ENCOUNTER — ROUTINE PRENATAL (OUTPATIENT)
Dept: OBGYN CLINIC | Age: 29
End: 2019-10-22
Payer: COMMERCIAL

## 2019-10-22 VITALS
BODY MASS INDEX: 31.74 KG/M2 | DIASTOLIC BLOOD PRESSURE: 62 MMHG | HEART RATE: 92 BPM | SYSTOLIC BLOOD PRESSURE: 100 MMHG | WEIGHT: 179.2 LBS

## 2019-10-22 DIAGNOSIS — Z34.82 ENCOUNTER FOR SUPERVISION OF OTHER NORMAL PREGNANCY IN SECOND TRIMESTER: ICD-10-CM

## 2019-10-22 DIAGNOSIS — Z34.82 ENCOUNTER FOR SUPERVISION OF OTHER NORMAL PREGNANCY IN SECOND TRIMESTER: Primary | ICD-10-CM

## 2019-10-22 DIAGNOSIS — Z3A.19 19 WEEKS GESTATION OF PREGNANCY: ICD-10-CM

## 2019-10-22 PROCEDURE — G8417 CALC BMI ABV UP PARAM F/U: HCPCS | Performed by: OBSTETRICS & GYNECOLOGY

## 2019-10-22 PROCEDURE — 99213 OFFICE O/P EST LOW 20 MIN: CPT | Performed by: OBSTETRICS & GYNECOLOGY

## 2019-10-22 PROCEDURE — G8484 FLU IMMUNIZE NO ADMIN: HCPCS | Performed by: OBSTETRICS & GYNECOLOGY

## 2019-10-22 PROCEDURE — 4004F PT TOBACCO SCREEN RCVD TLK: CPT | Performed by: OBSTETRICS & GYNECOLOGY

## 2019-10-22 PROCEDURE — G8427 DOCREV CUR MEDS BY ELIG CLIN: HCPCS | Performed by: OBSTETRICS & GYNECOLOGY

## 2019-10-24 LAB — URINE CULTURE, ROUTINE: NORMAL

## 2019-10-29 ENCOUNTER — OFFICE VISIT (OUTPATIENT)
Dept: FAMILY MEDICINE CLINIC | Age: 29
End: 2019-10-29
Payer: COMMERCIAL

## 2019-10-29 VITALS
BODY MASS INDEX: 31.71 KG/M2 | WEIGHT: 179 LBS | SYSTOLIC BLOOD PRESSURE: 102 MMHG | TEMPERATURE: 98.1 F | RESPIRATION RATE: 14 BRPM | HEART RATE: 94 BPM | OXYGEN SATURATION: 96 % | DIASTOLIC BLOOD PRESSURE: 64 MMHG | HEIGHT: 63 IN

## 2019-10-29 DIAGNOSIS — J02.9 SORE THROAT: ICD-10-CM

## 2019-10-29 DIAGNOSIS — R09.89 ABNORMAL LUNG SOUNDS: ICD-10-CM

## 2019-10-29 DIAGNOSIS — B96.89 ACUTE BACTERIAL SINUSITIS: Primary | ICD-10-CM

## 2019-10-29 DIAGNOSIS — J01.90 ACUTE BACTERIAL SINUSITIS: Primary | ICD-10-CM

## 2019-10-29 PROCEDURE — 4004F PT TOBACCO SCREEN RCVD TLK: CPT | Performed by: NURSE PRACTITIONER

## 2019-10-29 PROCEDURE — 87880 STREP A ASSAY W/OPTIC: CPT | Performed by: NURSE PRACTITIONER

## 2019-10-29 PROCEDURE — G8484 FLU IMMUNIZE NO ADMIN: HCPCS | Performed by: NURSE PRACTITIONER

## 2019-10-29 PROCEDURE — 99213 OFFICE O/P EST LOW 20 MIN: CPT | Performed by: NURSE PRACTITIONER

## 2019-10-29 PROCEDURE — G8427 DOCREV CUR MEDS BY ELIG CLIN: HCPCS | Performed by: NURSE PRACTITIONER

## 2019-10-29 PROCEDURE — G8417 CALC BMI ABV UP PARAM F/U: HCPCS | Performed by: NURSE PRACTITIONER

## 2019-10-29 RX ORDER — AZITHROMYCIN 250 MG/1
TABLET, FILM COATED ORAL
Qty: 6 TABLET | Refills: 0 | Status: SHIPPED | OUTPATIENT
Start: 2019-10-29 | End: 2019-11-21 | Stop reason: ALTCHOICE

## 2019-10-29 RX ORDER — AMOXICILLIN 500 MG/1
1000 CAPSULE ORAL 3 TIMES DAILY
Qty: 42 CAPSULE | Refills: 0 | Status: SHIPPED | OUTPATIENT
Start: 2019-10-29 | End: 2019-11-05

## 2019-10-29 ASSESSMENT — ENCOUNTER SYMPTOMS
WHEEZING: 0
SORE THROAT: 1
COUGH: 1
RHINORRHEA: 1

## 2019-10-31 ENCOUNTER — TELEPHONE (OUTPATIENT)
Dept: FAMILY MEDICINE CLINIC | Age: 29
End: 2019-10-31

## 2019-10-31 LAB — THROAT CULTURE: NORMAL

## 2019-11-21 ENCOUNTER — ROUTINE PRENATAL (OUTPATIENT)
Dept: OBGYN CLINIC | Age: 29
End: 2019-11-21
Payer: COMMERCIAL

## 2019-11-21 VITALS
HEART RATE: 97 BPM | BODY MASS INDEX: 32.42 KG/M2 | WEIGHT: 183 LBS | SYSTOLIC BLOOD PRESSURE: 100 MMHG | DIASTOLIC BLOOD PRESSURE: 60 MMHG

## 2019-11-21 DIAGNOSIS — Z3A.24 24 WEEKS GESTATION OF PREGNANCY: ICD-10-CM

## 2019-11-21 DIAGNOSIS — Z34.82 ENCOUNTER FOR SUPERVISION OF OTHER NORMAL PREGNANCY IN SECOND TRIMESTER: Primary | ICD-10-CM

## 2019-11-21 DIAGNOSIS — Z34.82 ENCOUNTER FOR SUPERVISION OF OTHER NORMAL PREGNANCY IN SECOND TRIMESTER: ICD-10-CM

## 2019-11-21 DIAGNOSIS — Z32.01 POSITIVE PREGNANCY TEST: ICD-10-CM

## 2019-11-21 PROCEDURE — G8484 FLU IMMUNIZE NO ADMIN: HCPCS | Performed by: OBSTETRICS & GYNECOLOGY

## 2019-11-21 PROCEDURE — G8417 CALC BMI ABV UP PARAM F/U: HCPCS | Performed by: OBSTETRICS & GYNECOLOGY

## 2019-11-21 PROCEDURE — 99213 OFFICE O/P EST LOW 20 MIN: CPT | Performed by: OBSTETRICS & GYNECOLOGY

## 2019-11-21 PROCEDURE — G8427 DOCREV CUR MEDS BY ELIG CLIN: HCPCS | Performed by: OBSTETRICS & GYNECOLOGY

## 2019-11-21 PROCEDURE — 4004F PT TOBACCO SCREEN RCVD TLK: CPT | Performed by: OBSTETRICS & GYNECOLOGY

## 2019-11-23 LAB — URINE CULTURE, ROUTINE: NORMAL

## 2019-11-24 LAB
6-ACETYLMORPHINE: NOT DETECTED
7-AMINOCLONAZEPAM: NOT DETECTED
ALPHA-OH-ALPRAZOLAM: NOT DETECTED
ALPRAZOLAM: NOT DETECTED
AMPHETAMINE: NOT DETECTED
BARBITURATES: NOT DETECTED
BENZOYLECGONINE: NOT DETECTED
BUPRENORPHINE: NOT DETECTED
CARISOPRODOL: NOT DETECTED
CLONAZEPAM: NOT DETECTED
CODEINE: NOT DETECTED
CREATININE URINE: 158.6 MG/DL (ref 20–400)
DIAZEPAM: NOT DETECTED
EER PAIN MGT DRUG PANEL, HIGH RES/EMIT U: NORMAL
ETHYL GLUCURONIDE: NOT DETECTED
FENTANYL: NOT DETECTED
HYDROCODONE: NOT DETECTED
HYDROMORPHONE: NOT DETECTED
LORAZEPAM: NOT DETECTED
MARIJUANA METABOLITE: PRESENT
MDA: NOT DETECTED
MDEA: NOT DETECTED
MDMA URINE: NOT DETECTED
MEPERIDINE: NOT DETECTED
METHADONE: NOT DETECTED
METHAMPHETAMINE: NOT DETECTED
METHYLPHENIDATE: NOT DETECTED
MIDAZOLAM: NOT DETECTED
MORPHINE: NOT DETECTED
NORBUPRENORPHINE, FREE: NOT DETECTED
NORDIAZEPAM: NOT DETECTED
NORFENTANYL: NOT DETECTED
NORHYDROCODONE, URINE: NOT DETECTED
NOROXYCODONE: NOT DETECTED
NOROXYMORPHONE, URINE: NOT DETECTED
OXAZEPAM: NOT DETECTED
OXYCODONE: NOT DETECTED
OXYMORPHONE: NOT DETECTED
PAIN MANAGEMENT DRUG PANEL: NORMAL
PCP: NOT DETECTED
PHENTERMINE: NOT DETECTED
PROPOXYPHENE: NOT DETECTED
TAPENTADOL, URINE: NOT DETECTED
TAPENTADOL-O-SULFATE, URINE: NOT DETECTED
TEMAZEPAM: NOT DETECTED
TRAMADOL: NOT DETECTED
ZOLPIDEM: NOT DETECTED

## 2019-12-04 ENCOUNTER — TELEPHONE (OUTPATIENT)
Dept: OBGYN CLINIC | Age: 29
End: 2019-12-04

## 2019-12-20 ENCOUNTER — ROUTINE PRENATAL (OUTPATIENT)
Dept: OBGYN CLINIC | Age: 29
End: 2019-12-20
Payer: COMMERCIAL

## 2019-12-20 VITALS
DIASTOLIC BLOOD PRESSURE: 64 MMHG | SYSTOLIC BLOOD PRESSURE: 86 MMHG | HEART RATE: 82 BPM | WEIGHT: 185 LBS | BODY MASS INDEX: 32.77 KG/M2

## 2019-12-20 DIAGNOSIS — O99.333 MATERNAL TOBACCO USE IN THIRD TRIMESTER: ICD-10-CM

## 2019-12-20 DIAGNOSIS — Z3A.28 28 WEEKS GESTATION OF PREGNANCY: ICD-10-CM

## 2019-12-20 DIAGNOSIS — Z34.83 ENCOUNTER FOR SUPERVISION OF OTHER NORMAL PREGNANCY IN THIRD TRIMESTER: Primary | ICD-10-CM

## 2019-12-20 DIAGNOSIS — Z34.83 ENCOUNTER FOR SUPERVISION OF OTHER NORMAL PREGNANCY IN THIRD TRIMESTER: ICD-10-CM

## 2019-12-20 LAB
GLUCOSE, 1HR PP: 102 MG/DL (ref 60–140)
HCT VFR BLD CALC: 35.1 % (ref 37–47)
HEMOGLOBIN: 12.1 G/DL (ref 12–16)

## 2019-12-20 PROCEDURE — G8427 DOCREV CUR MEDS BY ELIG CLIN: HCPCS | Performed by: OBSTETRICS & GYNECOLOGY

## 2019-12-20 PROCEDURE — 99213 OFFICE O/P EST LOW 20 MIN: CPT | Performed by: OBSTETRICS & GYNECOLOGY

## 2019-12-20 PROCEDURE — G8417 CALC BMI ABV UP PARAM F/U: HCPCS | Performed by: OBSTETRICS & GYNECOLOGY

## 2019-12-20 PROCEDURE — 36415 COLL VENOUS BLD VENIPUNCTURE: CPT | Performed by: OBSTETRICS & GYNECOLOGY

## 2019-12-20 PROCEDURE — 4004F PT TOBACCO SCREEN RCVD TLK: CPT | Performed by: OBSTETRICS & GYNECOLOGY

## 2019-12-20 PROCEDURE — G8484 FLU IMMUNIZE NO ADMIN: HCPCS | Performed by: OBSTETRICS & GYNECOLOGY

## 2019-12-22 LAB — URINE CULTURE, ROUTINE: NORMAL

## 2019-12-28 ENCOUNTER — HOSPITAL ENCOUNTER (OUTPATIENT)
Dept: ULTRASOUND IMAGING | Age: 29
Discharge: HOME | End: 2019-12-30
Payer: COMMERCIAL

## 2019-12-28 DIAGNOSIS — Z34.82 ENCOUNTER FOR SUPERVISION OF OTHER NORMAL PREGNANCY IN SECOND TRIMESTER: ICD-10-CM

## 2019-12-28 DIAGNOSIS — Z3A.24 24 WEEKS GESTATION OF PREGNANCY: ICD-10-CM

## 2019-12-28 PROCEDURE — 76815 OB US LIMITED FETUS(S): CPT

## 2020-01-07 ENCOUNTER — TELEPHONE (OUTPATIENT)
Dept: OBGYN CLINIC | Age: 30
End: 2020-01-07

## 2020-01-07 NOTE — TELEPHONE ENCOUNTER
----- Message from Parag Bullard MD sent at 1/6/2020  9:37 AM EST -----  Repeat US in 4wks for fetal renal cyst

## 2020-01-07 NOTE — TELEPHONE ENCOUNTER
Pt made aware of abn fetal US, pt with no f/u appt, she has transferred to [facility], bc she would like to deliver at [facility]. Per pt new provider has seen US, advised pt to make sure they are discussing 12/28 US.  Per pt she was told cyst was on her kidney did explain to pt cyst was seen on fetal kidney and to discuss further with new provider

## 2023-09-22 ENCOUNTER — OFFICE VISIT (OUTPATIENT)
Dept: PRIMARY CARE | Facility: CLINIC | Age: 33
End: 2023-09-22
Payer: COMMERCIAL

## 2023-09-22 VITALS
DIASTOLIC BLOOD PRESSURE: 64 MMHG | HEIGHT: 63 IN | BODY MASS INDEX: 35.9 KG/M2 | OXYGEN SATURATION: 97 % | SYSTOLIC BLOOD PRESSURE: 106 MMHG | RESPIRATION RATE: 16 BRPM | WEIGHT: 202.6 LBS | HEART RATE: 86 BPM

## 2023-09-22 DIAGNOSIS — F90.9 ATTENTION DEFICIT HYPERACTIVITY DISORDER (ADHD), UNSPECIFIED ADHD TYPE: Primary | ICD-10-CM

## 2023-09-22 DIAGNOSIS — Z79.899 MEDICATION MANAGEMENT: ICD-10-CM

## 2023-09-22 PROBLEM — R87.613 HGSIL (HIGH GRADE SQUAMOUS INTRAEPITHELIAL LESION) ON PAP SMEAR OF CERVIX: Status: ACTIVE | Noted: 2017-12-19

## 2023-09-22 PROCEDURE — 99213 OFFICE O/P EST LOW 20 MIN: CPT | Performed by: FAMILY MEDICINE

## 2023-09-22 PROCEDURE — 4004F PT TOBACCO SCREEN RCVD TLK: CPT | Performed by: FAMILY MEDICINE

## 2023-09-22 PROCEDURE — 3008F BODY MASS INDEX DOCD: CPT | Performed by: FAMILY MEDICINE

## 2023-09-22 ASSESSMENT — ENCOUNTER SYMPTOMS
SEIZURES: 0
FEVER: 0
CONSTITUTIONAL NEGATIVE: 1
POLYPHAGIA: 0
DIZZINESS: 0
PALPITATIONS: 0
RHINORRHEA: 0
FATIGUE: 0
POLYDIPSIA: 0
STRIDOR: 0
HEADACHES: 0
ABDOMINAL PAIN: 0
SHORTNESS OF BREATH: 0
TROUBLE SWALLOWING: 0
RECTAL PAIN: 0
DIARRHEA: 0
BLOOD IN STOOL: 0
CONSTIPATION: 0
CHEST TIGHTNESS: 0
EYE PAIN: 0
DECREASED CONCENTRATION: 1
ADENOPATHY: 0
SINUS PAIN: 0
COUGH: 0
CONFUSION: 0
NECK STIFFNESS: 0
SINUS PRESSURE: 0
NERVOUS/ANXIOUS: 0
SPEECH DIFFICULTY: 0
HEMATURIA: 0
COLOR CHANGE: 0
APPETITE CHANGE: 0
DYSURIA: 0
DYSPHORIC MOOD: 0
FLANK PAIN: 0
ACTIVITY CHANGE: 0
SORE THROAT: 0
AGITATION: 0
SLEEP DISTURBANCE: 0
ABDOMINAL DISTENTION: 0
PHOTOPHOBIA: 0
MYALGIAS: 0
ARTHRALGIAS: 0

## 2023-09-22 NOTE — PROGRESS NOTES
Subjective   Patient ID: Helene Harris is a 33 y.o. female who presents for ADHD.    ADHD  Pertinent negatives include no abdominal pain, arthralgias, chest pain, congestion, coughing, fatigue, fever, headaches, myalgias, rash or sore throat.      Patient would like to discuss going back to Adderall XR 20 mg for her ADHD. She has been off the medication for about 1 year.  She has been away due to lost insurance coverage.   She is currently not taking any medications.     Review of Systems   Constitutional: Negative.  Negative for activity change, appetite change, fatigue and fever.   HENT:  Negative for congestion, dental problem, ear discharge, ear pain, mouth sores, rhinorrhea, sinus pressure, sinus pain, sore throat, tinnitus and trouble swallowing.    Eyes:  Negative for photophobia, pain and visual disturbance.   Respiratory:  Negative for cough, chest tightness, shortness of breath and stridor.    Cardiovascular:  Negative for chest pain and palpitations.   Gastrointestinal:  Negative for abdominal distention, abdominal pain, blood in stool, constipation, diarrhea and rectal pain.   Endocrine: Negative for cold intolerance, heat intolerance, polydipsia, polyphagia and polyuria.   Genitourinary:  Negative for dysuria, flank pain, hematuria and urgency.   Musculoskeletal:  Negative for arthralgias, gait problem, myalgias and neck stiffness.   Skin:  Negative for color change and rash.   Allergic/Immunologic: Negative for environmental allergies and food allergies.   Neurological:  Negative for dizziness, seizures, syncope, speech difficulty and headaches.   Hematological:  Negative for adenopathy.   Psychiatric/Behavioral:  Positive for decreased concentration. Negative for agitation, confusion, dysphoric mood and sleep disturbance. The patient is not nervous/anxious.        Objective   /64 (BP Location: Right arm, Patient Position: Sitting, BP Cuff Size: Adult)   Pulse 86   Resp 16   Ht 1.6 m (5'  "3\")   Wt 91.9 kg (202 lb 9.6 oz)   SpO2 97%   BMI 35.89 kg/m²     Physical Exam  Vitals reviewed.   Constitutional:       General: She is not in acute distress.     Appearance: She is obese. She is not ill-appearing or diaphoretic.   HENT:      Head: Normocephalic.      Right Ear: Tympanic membrane and external ear normal.      Left Ear: Tympanic membrane and external ear normal.      Nose: Nose normal. No congestion.      Mouth/Throat:      Pharynx: No posterior oropharyngeal erythema.   Eyes:      General:         Right eye: No discharge.         Left eye: No discharge.      Extraocular Movements: Extraocular movements intact.      Conjunctiva/sclera: Conjunctivae normal.      Pupils: Pupils are equal, round, and reactive to light.   Cardiovascular:      Rate and Rhythm: Normal rate and regular rhythm.      Pulses: Normal pulses.      Heart sounds: Normal heart sounds. No murmur heard.  Pulmonary:      Effort: Pulmonary effort is normal. No respiratory distress.      Breath sounds: Normal breath sounds. No wheezing or rales.   Chest:      Chest wall: No tenderness.   Abdominal:      General: Bowel sounds are normal. There is distension.      Palpations: There is no mass.      Tenderness: There is no abdominal tenderness. There is no guarding.   Musculoskeletal:         General: No tenderness. Normal range of motion.      Cervical back: Normal range of motion and neck supple. No tenderness.      Right lower leg: No edema.      Left lower leg: No edema.   Skin:     General: Skin is dry.      Coloration: Skin is not jaundiced.      Findings: No bruising, erythema or rash.   Neurological:      General: No focal deficit present.      Mental Status: She is alert and oriented to person, place, and time. Mental status is at baseline.      Cranial Nerves: No cranial nerve deficit.      Sensory: No sensory deficit.      Coordination: Coordination normal.      Gait: Gait normal.   Psychiatric:         Mood and Affect: " Mood normal.         Thought Content: Thought content normal.         Judgment: Judgment normal.         Assessment/Plan   Problem List Items Addressed This Visit       Attention deficit hyperactivity disorder (ADHD) - Primary    Relevant Medications    amphetamine-dextroamphetamine XR (Adderall XR) 20 mg 24 hr capsule     Other Visit Diagnoses       Medication management        Relevant Orders    Drug Screen, Urine With Reflex to Confirmation              Scribe Attestation  By signing my name below, IHeather RMA , Scribe   attest that this documentation has been prepared under the direction and in the presence of Chris Davey DO.   Provider Attestation - Scribe documentation    All medical record entries made by the Scribe were at my direction and personally dictated by me. I have reviewed the chart and agree that the record accurately reflects my personal performance of the history, physical exam, discussion and plan.

## 2023-09-22 NOTE — PATIENT INSTRUCTIONS
Follow up in 1 month    Continue current medications and therapy for chronic medical conditions.    Patient was advised importance of proper diet/nutrition in addition adequate hydration. Patient was encouraged moderate exercise program to include 30 minutes daily for 5 days of the week or 150 minutes weekly. Patient will follow-up with us as scheduled.    I personally reviewed the OARRS report for this patient. I have considered the risks of abuse, dependence, addiction, and diversion.    UDS/CSA: DUE     START ADDERALL XR 20MG ONCE DAILY

## 2023-09-24 RX ORDER — DEXTROAMPHETAMINE SACCHARATE, AMPHETAMINE ASPARTATE MONOHYDRATE, DEXTROAMPHETAMINE SULFATE AND AMPHETAMINE SULFATE 5; 5; 5; 5 MG/1; MG/1; MG/1; MG/1
20 CAPSULE, EXTENDED RELEASE ORAL EVERY MORNING
Qty: 30 CAPSULE | Refills: 0 | Status: SHIPPED | OUTPATIENT
Start: 2023-09-24 | End: 2023-11-01 | Stop reason: SDUPTHER

## 2023-10-25 ENCOUNTER — TELEPHONE (OUTPATIENT)
Dept: PRIMARY CARE | Facility: CLINIC | Age: 33
End: 2023-10-25
Payer: COMMERCIAL

## 2023-11-01 ENCOUNTER — CLINICAL SUPPORT (OUTPATIENT)
Dept: PRIMARY CARE | Facility: CLINIC | Age: 33
End: 2023-11-01
Payer: COMMERCIAL

## 2023-11-01 DIAGNOSIS — F90.9 ATTENTION DEFICIT HYPERACTIVITY DISORDER (ADHD), UNSPECIFIED ADHD TYPE: ICD-10-CM

## 2023-11-01 DIAGNOSIS — Z79.899 MEDICATION MANAGEMENT: ICD-10-CM

## 2023-11-01 LAB
AMPHETAMINES UR QL SCN: NORMAL
BARBITURATES UR QL SCN: NORMAL
BENZODIAZ UR QL SCN: NORMAL
BZE UR QL SCN: NORMAL
CANNABINOIDS UR QL SCN: NORMAL
FENTANYL+NORFENTANYL UR QL SCN: NORMAL
OPIATES UR QL SCN: NORMAL
OXYCODONE+OXYMORPHONE UR QL SCN: NORMAL
PCP UR QL SCN: NORMAL

## 2023-11-01 PROCEDURE — 80307 DRUG TEST PRSMV CHEM ANLYZR: CPT

## 2023-11-01 PROCEDURE — 80324 DRUG SCREEN AMPHETAMINES 1/2: CPT

## 2023-11-01 RX ORDER — DEXTROAMPHETAMINE SACCHARATE, AMPHETAMINE ASPARTATE MONOHYDRATE, DEXTROAMPHETAMINE SULFATE AND AMPHETAMINE SULFATE 5; 5; 5; 5 MG/1; MG/1; MG/1; MG/1
20 CAPSULE, EXTENDED RELEASE ORAL EVERY MORNING
Qty: 30 CAPSULE | Refills: 0 | Status: SHIPPED | OUTPATIENT
Start: 2023-11-01 | End: 2023-11-29 | Stop reason: SDUPTHER

## 2023-11-01 NOTE — TELEPHONE ENCOUNTER
PT [facility]      PT CALLED IN FOR MED REFILL     ADDERALL XR 20MG     [facility] [address] [id number]      PT DID COMPLETE UDS, SHE IS OUT OF MEDS PLEASE SIGN OFF ON PT'S MEDS.

## 2023-11-04 LAB
AMPHET UR-MCNC: <50 NG/ML
MDA UR-MCNC: <200 NG/ML
MDEA UR-MCNC: <200 NG/ML
MDMA UR-MCNC: <200 NG/ML
METHAMPHET UR-MCNC: <200 NG/ML
PHENTERMINE UR CFM-MCNC: <200 NG/ML

## 2023-11-29 DIAGNOSIS — F90.9 ATTENTION DEFICIT HYPERACTIVITY DISORDER (ADHD), UNSPECIFIED ADHD TYPE: ICD-10-CM

## 2023-11-29 NOTE — TELEPHONE ENCOUNTER
Dr roy pt  Refill on amphetamine-dextroamphetamine XR (Adderall XR) 20 mg 24 hr capsule   Pharmacy    [id number]  [place], [place] - [address] [facility]

## 2023-12-02 DIAGNOSIS — F90.9 ATTENTION DEFICIT HYPERACTIVITY DISORDER (ADHD), UNSPECIFIED ADHD TYPE: ICD-10-CM

## 2023-12-02 RX ORDER — DEXTROAMPHETAMINE SACCHARATE, AMPHETAMINE ASPARTATE MONOHYDRATE, DEXTROAMPHETAMINE SULFATE AND AMPHETAMINE SULFATE 5; 5; 5; 5 MG/1; MG/1; MG/1; MG/1
20 CAPSULE, EXTENDED RELEASE ORAL EVERY MORNING
Qty: 30 CAPSULE | Refills: 0 | Status: SHIPPED | OUTPATIENT
Start: 2023-12-02 | End: 2024-01-16 | Stop reason: SDUPTHER

## 2023-12-07 RX ORDER — DEXTROAMPHETAMINE SACCHARATE, AMPHETAMINE ASPARTATE MONOHYDRATE, DEXTROAMPHETAMINE SULFATE AND AMPHETAMINE SULFATE 5; 5; 5; 5 MG/1; MG/1; MG/1; MG/1
20 CAPSULE, EXTENDED RELEASE ORAL EVERY MORNING
Qty: 30 CAPSULE | Refills: 0 | OUTPATIENT
Start: 2023-12-07 | End: 2024-01-06

## 2024-01-02 ENCOUNTER — PROCEDURE VISIT (OUTPATIENT)
Dept: OBSTETRICS AND GYNECOLOGY | Facility: CLINIC | Age: 34
End: 2024-01-02
Payer: COMMERCIAL

## 2024-01-02 VITALS
DIASTOLIC BLOOD PRESSURE: 68 MMHG | BODY MASS INDEX: 34.09 KG/M2 | HEIGHT: 63 IN | WEIGHT: 192.4 LBS | SYSTOLIC BLOOD PRESSURE: 112 MMHG

## 2024-01-02 DIAGNOSIS — Z30.9 ENCOUNTER FOR CONTRACEPTIVE MANAGEMENT, UNSPECIFIED TYPE: ICD-10-CM

## 2024-01-02 DIAGNOSIS — Z30.46 NEXPLANON REMOVAL: Primary | ICD-10-CM

## 2024-01-02 PROCEDURE — 11982 REMOVE DRUG IMPLANT DEVICE: CPT | Performed by: ADVANCED PRACTICE MIDWIFE

## 2024-01-02 RX ORDER — BUPIVACAINE HYDROCHLORIDE 2.5 MG/ML
5 INJECTION, SOLUTION INFILTRATION; PERINEURAL ONCE
Status: COMPLETED | OUTPATIENT
Start: 2024-01-02 | End: 2024-01-02

## 2024-01-02 RX ADMIN — BUPIVACAINE HYDROCHLORIDE 12.5 MG: 2.5 INJECTION, SOLUTION INFILTRATION; PERINEURAL at 09:29

## 2024-01-02 ASSESSMENT — PAIN SCALES - GENERAL: PAINLEVEL_OUTOF10: 0 - NO PAIN

## 2024-01-02 NOTE — PROGRESS NOTES
NEXPLANON REMOVAL INSERTION NOTE      Patient's pre-procedure questions were answered an verbal consent was given and patient agrees to proceed with the procedure.   The proximal and distal ends of the Nexplanon device were identified in the patient's upper extremity, they were then marked with a pen.  The site was cleansed with ChloraPrep.  The site was anesthetized with 5mL of 1% lidocaine with epinephrine was inserted just below the distal tip, with the aid of compression of the proximal tip to elevate the distal tip of the Nexplanon device.  Once this was done the planned incision site was checked and was adequately anesthetized.  A 3 mm incision was made over the distal tip of the Nexplanon marsha.  After this was done the proximal end of the Nexplanon marsha was compressed to bring the distal tip to the opening.  Any present scar tissue superior to the tip of the Nexplanon marsha tip was lysed sharply with a scalpel.  The device was then extruded through the skin incision.  The Nexplanon marsha was then able to grasped with a hemostat and it was removed intact without difficulty and was intact.  Upon removal the Nexplanon device was inspected and it was fully intact.  The site was dressed with a Band-Aid and a sterile gauze to prevent bruising.  There were no complications.  Blood loss was minimal.    ANNA Martin-ZENAIDA

## 2024-01-03 ENCOUNTER — APPOINTMENT (OUTPATIENT)
Dept: PRIMARY CARE | Facility: CLINIC | Age: 34
End: 2024-01-03
Payer: COMMERCIAL

## 2024-01-05 ENCOUNTER — APPOINTMENT (OUTPATIENT)
Dept: OBSTETRICS AND GYNECOLOGY | Facility: CLINIC | Age: 34
End: 2024-01-05
Payer: COMMERCIAL

## 2024-01-16 ENCOUNTER — OFFICE VISIT (OUTPATIENT)
Dept: PRIMARY CARE | Facility: CLINIC | Age: 34
End: 2024-01-16
Payer: COMMERCIAL

## 2024-01-16 VITALS
BODY MASS INDEX: 34.2 KG/M2 | SYSTOLIC BLOOD PRESSURE: 124 MMHG | WEIGHT: 193 LBS | HEIGHT: 63 IN | HEART RATE: 67 BPM | DIASTOLIC BLOOD PRESSURE: 64 MMHG | OXYGEN SATURATION: 98 %

## 2024-01-16 DIAGNOSIS — F90.9 ATTENTION DEFICIT HYPERACTIVITY DISORDER (ADHD), UNSPECIFIED ADHD TYPE: ICD-10-CM

## 2024-01-16 DIAGNOSIS — Z80.0 FAMILY HISTORY OF THROAT CANCER: ICD-10-CM

## 2024-01-16 DIAGNOSIS — Z72.0 TOBACCO ABUSE: Primary | ICD-10-CM

## 2024-01-16 PROCEDURE — 99214 OFFICE O/P EST MOD 30 MIN: CPT | Performed by: FAMILY MEDICINE

## 2024-01-16 RX ORDER — DEXTROAMPHETAMINE SACCHARATE, AMPHETAMINE ASPARTATE MONOHYDRATE, DEXTROAMPHETAMINE SULFATE AND AMPHETAMINE SULFATE 7.5; 7.5; 7.5; 7.5 MG/1; MG/1; MG/1; MG/1
30 CAPSULE, EXTENDED RELEASE ORAL EVERY MORNING
Qty: 30 CAPSULE | Refills: 0 | Status: SHIPPED | OUTPATIENT
Start: 2024-01-16 | End: 2024-02-14 | Stop reason: SDUPTHER

## 2024-01-16 NOTE — PROGRESS NOTES
Subjective   Patient ID: Helene Harris is a 33 y.o. female who presents for ADHD.    HPI Pt is currently taking Adderall 20 mg and states it seems to be wearing off around 4 pm and would like to discuss an increase today.    Review of Systems  12 Systems have been reviewed as follows.   Constitutional: Fever, weight gain, weight loss, appetite change, night sweats, fatigue, chills.  Eyes : blurry, double vision, vision, loss, tearing, redness, pain, sensitivity to light, glaucoma.  Ears, nose, mouth, and throat: Hearing loss, ringing in the ears, ear pain, nasal congestion, nasal drainage, nosebleeds, mouth, throat, irritation tooth problem.  Cardiovascular :chest pain, pressure, heart racing, palpitations, sweating, leg swelling, high or low blood pressure  Pulmonary: Cough, yellow or green sputum, blood and sputum, shortness of breath, wheezing  Gastrointestinal: Nausea, vomiting, diarrhea, constipation, pain, blood in stool, or vomitus, heartburn, difficulty swallowing  Genitourinary: incontinence, abnormal bleeding, abnormal discharge, urinary frequency, urinary hesitancy, pain, impotence sexual problem, infection, urinary retention  Musculoskeletal: Pain, stiffness, joint, redness or warmth, arthritis, back pain, weakness, muscle wasting, sprain or fracture  Neuro: Weight weakness, dizziness, change in voice, change in taste change in vision, change in hearing, loss, or change of sensation, trouble walking, balance problems coordination problems, shaking, speech problem  Endocrine , cold or heat intolerance, blood sugar problem, weight gain or loss missed periods hot flashes, sweats, change in body hair, change in libido, increased thirst, increased urination  Heme/lymph: Swelling, bleeding, problem anemia, bruising, enlarged lymph nodes  Allergic/immunologic: H. plus nasal drip, watery itchy eyes, nasal drainage, immunosuppressed  The above were reviewed and noted negative except as noted in HPI and  "Problem List.      Objective   /64 (BP Location: Left arm, Patient Position: Sitting, BP Cuff Size: Adult)   Pulse 67   Ht 1.6 m (5' 3\")   Wt 87.5 kg (193 lb)   LMP 12/29/2023   SpO2 98%   BMI 34.19 kg/m²     Physical Exam  Constitutional: Well developed, well nourished, alert and in no acute distress  Eyes: Normal external exam. Pupils equally round and reactive to light with normal accommodation and extraocular movements intact.  Neck: Supple, no lymphadenopathy or masses.  Cardiovascular: Regular rate and rhythm, normal S1 and S2, no murmurs, gallops, or rubs. Radial pulses normal. No peripheral edema.  Abdomen: soft, non tender, distended, no masses or HSM.  Pulmonary: No respiratory distress, lungs clear to auscultation bilaterally. No wheezes, rhonchi, rales.  Skin: Warm, well perfused, normal skin turgor and color.  Neurologic: Cranial nerves II-XII grossly intact.  Psychiatric: Mood calm and affect normal  Musculoskeletal: Moving all extremities without restriction      Assessment/Plan   Problem List Items Addressed This Visit             ICD-10-CM    Attention deficit hyperactivity disorder (ADHD) F90.9    Relevant Medications    amphetamine-dextroamphetamine XR (Adderall XR) 30 mg 24 hr capsule        Scribe Attestation  By signing my name below, I, Ashwin Rdz DO, Scribe   attest that this documentation has been prepared under the direction and in the presence of Ashwin Rdz DO.    Provider Attestation - Scribe documentation    All medical record entries made by the Scribe were at my direction and personally dictated by me. I have reviewed the chart and agree that the record accurately reflects my personal performance of the history, physical exam, discussion and plan.      Follow up with Dr. Davey for future appointments   "

## 2024-02-14 DIAGNOSIS — F90.9 ATTENTION DEFICIT HYPERACTIVITY DISORDER (ADHD), UNSPECIFIED ADHD TYPE: ICD-10-CM

## 2024-02-14 NOTE — TELEPHONE ENCOUNTER
Pt needs refill   amphetamine-dextroamphetamine XR (Adderall XR) 30 mg 24 hr capsule     Pharmacy    [facility] - [place], [place] - [address] [id number]  Phone: [phone]  Fax: [phone]

## 2024-02-17 RX ORDER — DEXTROAMPHETAMINE SACCHARATE, AMPHETAMINE ASPARTATE MONOHYDRATE, DEXTROAMPHETAMINE SULFATE AND AMPHETAMINE SULFATE 7.5; 7.5; 7.5; 7.5 MG/1; MG/1; MG/1; MG/1
30 CAPSULE, EXTENDED RELEASE ORAL EVERY MORNING
Qty: 30 CAPSULE | Refills: 0 | Status: SHIPPED | OUTPATIENT
Start: 2024-02-17 | End: 2024-03-15 | Stop reason: SDUPTHER

## 2024-03-15 DIAGNOSIS — F90.9 ATTENTION DEFICIT HYPERACTIVITY DISORDER (ADHD), UNSPECIFIED ADHD TYPE: ICD-10-CM

## 2024-03-15 NOTE — TELEPHONE ENCOUNTER
[id number] - [place], [place] - [address] [address]   Pt needs refill on adderall please

## 2024-03-16 RX ORDER — DEXTROAMPHETAMINE SACCHARATE, AMPHETAMINE ASPARTATE MONOHYDRATE, DEXTROAMPHETAMINE SULFATE AND AMPHETAMINE SULFATE 7.5; 7.5; 7.5; 7.5 MG/1; MG/1; MG/1; MG/1
30 CAPSULE, EXTENDED RELEASE ORAL EVERY MORNING
Qty: 30 CAPSULE | Refills: 0 | Status: SHIPPED | OUTPATIENT
Start: 2024-03-16 | End: 2024-05-03 | Stop reason: ALTCHOICE

## 2024-03-23 ENCOUNTER — HOSPITAL ENCOUNTER (EMERGENCY)
Facility: HOSPITAL | Age: 34
Discharge: HOME | End: 2024-03-23
Attending: EMERGENCY MEDICINE
Payer: COMMERCIAL

## 2024-03-23 VITALS
BODY MASS INDEX: 34.55 KG/M2 | SYSTOLIC BLOOD PRESSURE: 108 MMHG | OXYGEN SATURATION: 97 % | RESPIRATION RATE: 18 BRPM | WEIGHT: 195 LBS | HEIGHT: 63 IN | TEMPERATURE: 98.1 F | HEART RATE: 76 BPM | DIASTOLIC BLOOD PRESSURE: 66 MMHG

## 2024-03-23 DIAGNOSIS — S05.02XA ABRASION OF LEFT CORNEA, INITIAL ENCOUNTER: Primary | ICD-10-CM

## 2024-03-23 PROCEDURE — 2500000001 HC RX 250 WO HCPCS SELF ADMINISTERED DRUGS (ALT 637 FOR MEDICARE OP): Performed by: EMERGENCY MEDICINE

## 2024-03-23 PROCEDURE — 99283 EMERGENCY DEPT VISIT LOW MDM: CPT

## 2024-03-23 RX ORDER — OFLOXACIN 3 MG/ML
1 SOLUTION/ DROPS OPHTHALMIC 4 TIMES DAILY
Qty: 5 ML | Refills: 0 | Status: SHIPPED | OUTPATIENT
Start: 2024-03-23 | End: 2024-04-02

## 2024-03-23 RX ORDER — HYDROCODONE BITARTRATE AND ACETAMINOPHEN 5; 325 MG/1; MG/1
1 TABLET ORAL EVERY 4 HOURS PRN
Qty: 6 TABLET | Refills: 0 | Status: SHIPPED | OUTPATIENT
Start: 2024-03-23 | End: 2024-03-26

## 2024-03-23 RX ORDER — CIPROFLOXACIN HYDROCHLORIDE 3 MG/ML
1 SOLUTION/ DROPS OPHTHALMIC ONCE
Status: COMPLETED | OUTPATIENT
Start: 2024-03-23 | End: 2024-03-23

## 2024-03-23 RX ORDER — TETRACAINE HYDROCHLORIDE 5 MG/ML
1 SOLUTION OPHTHALMIC ONCE
Status: COMPLETED | OUTPATIENT
Start: 2024-03-23 | End: 2024-03-23

## 2024-03-23 RX ORDER — HYDROCODONE BITARTRATE AND ACETAMINOPHEN 5; 325 MG/1; MG/1
1 TABLET ORAL ONCE
Status: COMPLETED | OUTPATIENT
Start: 2024-03-23 | End: 2024-03-23

## 2024-03-23 RX ORDER — OFLOXACIN 3 MG/ML
2 SOLUTION/ DROPS OPHTHALMIC 4 TIMES DAILY
Status: DISCONTINUED | OUTPATIENT
Start: 2024-03-23 | End: 2024-03-23

## 2024-03-23 RX ADMIN — FLUORESCEIN SODIUM 1 STRIP: 1 STRIP OPHTHALMIC at 14:16

## 2024-03-23 RX ADMIN — TETRACAINE HYDROCHLORIDE 1 DROP: 5 SOLUTION OPHTHALMIC at 14:16

## 2024-03-23 RX ADMIN — CIPROFLOXACIN HYDROCHLORIDE 1 DROP: 3 SOLUTION/ DROPS OPHTHALMIC at 14:49

## 2024-03-23 RX ADMIN — HYDROCODONE BITARTRATE AND ACETAMINOPHEN 1 TABLET: 5; 325 TABLET ORAL at 14:49

## 2024-03-23 ASSESSMENT — PAIN DESCRIPTION - ORIENTATION: ORIENTATION: LEFT

## 2024-03-23 ASSESSMENT — PAIN - FUNCTIONAL ASSESSMENT
PAIN_FUNCTIONAL_ASSESSMENT: 0-10
PAIN_FUNCTIONAL_ASSESSMENT: 0-10

## 2024-03-23 ASSESSMENT — PAIN DESCRIPTION - PAIN TYPE: TYPE: ACUTE PAIN

## 2024-03-23 ASSESSMENT — COLUMBIA-SUICIDE SEVERITY RATING SCALE - C-SSRS
2. HAVE YOU ACTUALLY HAD ANY THOUGHTS OF KILLING YOURSELF?: NO
1. IN THE PAST MONTH, HAVE YOU WISHED YOU WERE DEAD OR WISHED YOU COULD GO TO SLEEP AND NOT WAKE UP?: NO
6. HAVE YOU EVER DONE ANYTHING, STARTED TO DO ANYTHING, OR PREPARED TO DO ANYTHING TO END YOUR LIFE?: NO

## 2024-03-23 ASSESSMENT — PAIN SCALES - GENERAL: PAINLEVEL_OUTOF10: 10 - WORST POSSIBLE PAIN

## 2024-03-23 ASSESSMENT — PAIN DESCRIPTION - LOCATION: LOCATION: EYE

## 2024-03-23 ASSESSMENT — VISUAL ACUITY
OD: 20/13
OS: 20/40
OU: 20FT

## 2024-03-23 ASSESSMENT — PAIN DESCRIPTION - DESCRIPTORS: DESCRIPTORS: ACHING

## 2024-03-23 NOTE — Clinical Note
Helene Harris was seen and treated in our emergency department on 3/23/2024.  She may return to work on 03/27/2024.       If you have any questions or concerns, please don't hesitate to call.      Luz Marina Keenan MD

## 2024-03-23 NOTE — ED PROVIDER NOTES
HPI   Chief Complaint   Patient presents with    Eye Pain     Pt was playing with her daughter who accidentally poked her in her L eye, unable to open it now        HPI: 33-year-old female presents with left eye trauma.  She states that she was poked accidentally in the eye by her daughter.  She states that now she has pain if she opens her eye.  She does not wear glasses or contacts.  She denies any double vision or blurry vision.  She is not any blood thinners.  She denies pregnancy.    Family HX: Denies any significant/pertinent family history.  Social Hx: Denies ETOH or drug use.  Review of systems:  Gen.: No weight loss, fatigue, anorexia, insomnia, fever.   Eyes: No vision loss, double vision, drainage, eye pain.   ENT: No pharyngitis, dry mouth.   Cardiac: No chest pain, palpitations, syncope, near syncope.   Pulmonary: No shortness of breath, cough, hemoptysis.   Heme/lymph: No swollen glands, fever, bleeding.   GI: No abdominal pain, change in bowel habits, melena, hematemesis, hematochezia, nausea, vomiting, diarrhea.   : No discharge, dysuria, frequency, urgency, hematuria.   Musculoskeletal: No limb pain, joint pain, joint swelling.   Skin: No rashes.   Psych: No depression, anxiety, suicidality, homicidality.   Review of systems is otherwise negative unless stated above or in history of present illness.    Physical Exam:    Appearance: Alert, oriented , cooperative,  in no acute distress. Well nourished & well hydrated.    Skin: Intact,  dry skin, no lesions, rash, petechiae or purpura.     Eyes: PERRLA, EOMs intact, left eye with mild conjunctival injection, pupil is regular and round and reactive to light, there is uptake with fluorescein about the lower visual axis about 4:00 to 7 o'clock position.  There is no [place] sign.  No pain with extraocular movement.  No proptosis.  No foreign body noted.  No foreign body noted with flipping of the lids.  ENT: Hearing grossly intact. External auditory  canals patent, tympanic membranes intact with visible landmarks. Nares patent, mucus membranes moist. Dentition without lesions. Pharynx clear, uvula midline.     Neck: Supple, without meningismus. Thyroid not palpable. Trachea at midline. No lymphadenopathy.    Pulmonary: Clear bilaterally with good chest wall excursion. No rales, rhonchi or wheezing. No accessory muscle use or stridor.    Cardiac: Normal S1, S2 without murmur, rub, gallop or extrasystole. No JVD, Carotids without bruits.    Abdomen: Soft, nontender, active bowel sounds.  No palpable organomegaly.  No rebound or guarding.  No CVA tenderness.    Genitourinary: Exam deferred.    Musculoskeletal: Full range of motion. no pain, edema, or deformity. Pulses full and equal. No cyanosis, clubbing, or edema.    Neurological:  Cranial nerves II through XII are grossly intact, finger-nose touch is normal, normal sensation, no weakness, no focal findings identified.    Psychiatric: Appropriate mood and affect.     Medical Decision-Making:  Testing: Patient was given tetracaine and fluorescein.  There was uptake consistent with a corneal abrasion.  Started on antibiotics.  She be given a prescription for Norco with sedation precautions.  Follow-up with ophthalmology as well as her PCP.  I do not see evidence of a suggest ruptured globe.  Treatment:   Reevaluation:   Plan: Homegoing. Discussed differential. Will follow-up with the primary physician in the next 2-3 days. Return if worse. They understand return precautions and discharge instructions. Patient and family/friend/caregiver are in agreement with this plan.   Impression:   1.  Corneal abrasion  2.                              [place] Coma Scale Score: 15                     Patient History   Past Medical History:   Diagnosis Date    Migraine, unspecified, not intractable, without status migrainosus     Migraine    Personal history of other infectious and parasitic diseases     History of streptococcal  infection    Personal history of other mental and behavioral disorders     History of depression    Personal history of other specified conditions     History of lump of left breast    Unspecified asthma, uncomplicated     Asthmatic bronchitis     Past Surgical History:   Procedure Laterality Date    MULTIPLE TOOTH EXTRACTIONS Bilateral      No family history on file.  Social History     Tobacco Use    Smoking status: Every Day     Packs/day: .5     Types: Cigarettes    Smokeless tobacco: Never   Substance Use Topics    Alcohol use: Yes     Comment: rarely    Drug use: Never       Physical Exam   ED Triage Vitals [03/23/24 1406]   Temperature Heart Rate Respirations BP   36.7 °C (98.1 °F) 94 18 (!) 130/95      Pulse Ox Temp Source Heart Rate Source Patient Position   96 % Temporal Monitor --      BP Location FiO2 (%)     -- --       Physical Exam    ED Course & MDM   Diagnoses as of 03/23/24 1445   Abrasion of left cornea, initial encounter       Medical Decision Making      Procedure  Procedures     Luz Marina Keenan MD  03/23/24 144

## 2024-04-16 ENCOUNTER — APPOINTMENT (OUTPATIENT)
Dept: PRIMARY CARE | Facility: CLINIC | Age: 34
End: 2024-04-16
Payer: COMMERCIAL

## 2024-05-03 ENCOUNTER — INITIAL PRENATAL (OUTPATIENT)
Dept: OBSTETRICS AND GYNECOLOGY | Facility: CLINIC | Age: 34
End: 2024-05-03
Payer: COMMERCIAL

## 2024-05-03 ENCOUNTER — LAB (OUTPATIENT)
Dept: LAB | Facility: LAB | Age: 34
End: 2024-05-03
Payer: COMMERCIAL

## 2024-05-03 VITALS — WEIGHT: 189 LBS | DIASTOLIC BLOOD PRESSURE: 62 MMHG | BODY MASS INDEX: 33.48 KG/M2 | SYSTOLIC BLOOD PRESSURE: 114 MMHG

## 2024-05-03 DIAGNOSIS — Z34.81 PRENATAL CARE, SUBSEQUENT PREGNANCY, FIRST TRIMESTER (HHS-HCC): Primary | ICD-10-CM

## 2024-05-03 DIAGNOSIS — Z34.81 PRENATAL CARE, SUBSEQUENT PREGNANCY, FIRST TRIMESTER (HHS-HCC): ICD-10-CM

## 2024-05-03 DIAGNOSIS — Z3A.01 6 WEEKS GESTATION OF PREGNANCY (HHS-HCC): ICD-10-CM

## 2024-05-03 DIAGNOSIS — O21.9 NAUSEA AND VOMITING IN PREGNANCY (HHS-HCC): ICD-10-CM

## 2024-05-03 LAB
ABO GROUP (TYPE) IN BLOOD: NORMAL
ANTIBODY SCREEN: NORMAL
ERYTHROCYTE [DISTWIDTH] IN BLOOD BY AUTOMATED COUNT: 12.9 % (ref 11.5–14.5)
EST. AVERAGE GLUCOSE BLD GHB EST-MCNC: 97 MG/DL
HBA1C MFR BLD: 5 %
HBV SURFACE AG SERPL QL IA: NONREACTIVE
HCT VFR BLD AUTO: 42.9 % (ref 36–46)
HCV AB SER QL: NONREACTIVE
HGB BLD-MCNC: 14.8 G/DL (ref 12–16)
HIV 1+2 AB+HIV1 P24 AG SERPL QL IA: NONREACTIVE
MCH RBC QN AUTO: 31.9 PG (ref 26–34)
MCHC RBC AUTO-ENTMCNC: 34.5 G/DL (ref 32–36)
MCV RBC AUTO: 93 FL (ref 80–100)
NRBC BLD-RTO: 0 /100 WBCS (ref 0–0)
PLATELET # BLD AUTO: 334 X10*3/UL (ref 150–450)
PREGNANCY TEST URINE, POC: POSITIVE
RBC # BLD AUTO: 4.64 X10*6/UL (ref 4–5.2)
REFLEX ADDED, ANEMIA PANEL: NORMAL
RH FACTOR (ANTIGEN D): NORMAL
RUBV IGG SERPL IA-ACNC: 2.1 IA
RUBV IGG SERPL QL IA: POSITIVE
TREPONEMA PALLIDUM IGG+IGM AB [PRESENCE] IN SERUM OR PLASMA BY IMMUNOASSAY: NONREACTIVE
WBC # BLD AUTO: 8.4 X10*3/UL (ref 4.4–11.3)

## 2024-05-03 PROCEDURE — 86900 BLOOD TYPING SEROLOGIC ABO: CPT

## 2024-05-03 PROCEDURE — 86317 IMMUNOASSAY INFECTIOUS AGENT: CPT

## 2024-05-03 PROCEDURE — 36415 COLL VENOUS BLD VENIPUNCTURE: CPT

## 2024-05-03 PROCEDURE — 86850 RBC ANTIBODY SCREEN: CPT

## 2024-05-03 PROCEDURE — 87340 HEPATITIS B SURFACE AG IA: CPT

## 2024-05-03 PROCEDURE — 86803 HEPATITIS C AB TEST: CPT

## 2024-05-03 PROCEDURE — 87389 HIV-1 AG W/HIV-1&-2 AB AG IA: CPT

## 2024-05-03 PROCEDURE — 86901 BLOOD TYPING SEROLOGIC RH(D): CPT

## 2024-05-03 PROCEDURE — 83036 HEMOGLOBIN GLYCOSYLATED A1C: CPT

## 2024-05-03 PROCEDURE — 81025 URINE PREGNANCY TEST: CPT | Performed by: ADVANCED PRACTICE MIDWIFE

## 2024-05-03 PROCEDURE — H1000 PRENATAL CARE ATRISK ASSESSM: HCPCS | Performed by: ADVANCED PRACTICE MIDWIFE

## 2024-05-03 PROCEDURE — 99214 OFFICE O/P EST MOD 30 MIN: CPT | Performed by: ADVANCED PRACTICE MIDWIFE

## 2024-05-03 PROCEDURE — 86780 TREPONEMA PALLIDUM: CPT

## 2024-05-03 PROCEDURE — 85027 COMPLETE CBC AUTOMATED: CPT

## 2024-05-03 PROCEDURE — 87086 URINE CULTURE/COLONY COUNT: CPT

## 2024-05-03 PROCEDURE — 83020 HEMOGLOBIN ELECTROPHORESIS: CPT | Performed by: ADVANCED PRACTICE MIDWIFE

## 2024-05-03 PROCEDURE — 83021 HEMOGLOBIN CHROMOTOGRAPHY: CPT

## 2024-05-03 ASSESSMENT — EDINBURGH POSTNATAL DEPRESSION SCALE (EPDS)
I HAVE BEEN SO UNHAPPY THAT I HAVE BEEN CRYING: NO, NEVER
I HAVE BEEN ABLE TO LAUGH AND SEE THE FUNNY SIDE OF THINGS: AS MUCH AS I ALWAYS COULD
I HAVE FELT SCARED OR PANICKY FOR NO GOOD REASON: NO, NOT AT ALL
TOTAL SCORE: 0
THINGS HAVE BEEN GETTING ON TOP OF ME: NO, I HAVE BEEN COPING AS WELL AS EVER
I HAVE BLAMED MYSELF UNNECESSARILY WHEN THINGS WENT WRONG: NO, NEVER
I HAVE LOOKED FORWARD WITH ENJOYMENT TO THINGS: AS MUCH AS I EVER DID
I HAVE BEEN SO UNHAPPY THAT I HAVE HAD DIFFICULTY SLEEPING: NOT AT ALL
I HAVE FELT SAD OR MISERABLE: NO, NOT AT ALL
THE THOUGHT OF HARMING MYSELF HAS OCCURRED TO ME: NEVER
I HAVE BEEN ANXIOUS OR WORRIED FOR NO GOOD REASON: NO, NOT AT ALL

## 2024-05-03 NOTE — PROGRESS NOTES
New patient, here for new OB visit alone. Previous pt of [facility] CNMs so is familiar with care.   OB History: - history of SAB x 1, EAB x 1 and FT,  x 2. First child (13 years old) born at [facility] and weighed 10 lb. 1 oz. Pt reports ? Shoulder dystocia with this delivery, though does not recall all specific details related to birth. Denies any resulting injury or issues with infant re: to delivery. Discussed physician consult later in pregnancy to discuss h/o ? Dystocia.   Second child (4 years old) delivered at [facility], weighed 7 lb 8 oz. Denies any issues/dystocia with that delivery.   This pregnancy is the same FOB as last pregnancy.   Denies h/o HDP, PPH, or GDM.   LMP: Is sure of date (tracks on an sally) and reports it was a normal menses. Discussed ANUSHA determination.   AMA: Yes- will be 34 years old at ANUSHA. Discussed optional referral to Genetics- pt declines.   ASA prophylaxis: Meets criteria due to BMI and AMA at ANUSHA. Discussed initiation of 162 mg per day, at 12 weeks. Will send Rx at next visit.   PNV: Not yet taking, though reports she will purchase OTC so she can visualize size of the capsule. Encouraged to begin taking at this time.   Past Medical History:   Diagnosis Date    Migraine, unspecified, not intractable, without status migrainosus     Migraine    Personal history of other infectious and parasitic diseases     History of streptococcal infection    Personal history of other mental and behavioral disorders     History of depression    Personal history of other specified conditions     History of lump of left breast    Unspecified asthma, uncomplicated ([facility])     Asthmatic bronchitis     PMH: - LEEP in 2017- unsure if she has had a pap done since LEEP as she was fearful of potential results.   - ADHD- was previously on Adderall, stopped upon learning of pregnancy. Feels she is coping OK at this time without medication. Prefers not to take Adderall during pregnancy, unless absolutely  needed. Encouraged to alert CNM if s/s worsen.   New OB Labs: Ordered. Discussed process/location on where to have drawn.   Pap: Unsure when last pap was done and no results located in computer. Reviewed recommendations regarding frequency of pap screening. Will do pap with PE, at next visit. Note history of LEEP in 2017.   Covid Vaccine: Strong recommendation and support for Covid vaccine discussed. Pt aware of increased rate of hospitalization, intubation, and death with Covid in pregnancy--demonstrated safety of vaccine during pregnancy with no associated increase in miscarriage/PTL nor fetal anomalies reviewed. Pt aware vaccine is recommended by ACOG, ACNM, SMFM, and CDC. Pt reports understanding though declines vaccination.   US: Discussed dating US, to be done in approximately 2 weeks (due to early gestation). Reviewed instructions for scheduling, location.   NIPS: Discussed/offered testing; pt desires, therefore will order and give kit at next visit. Discussed appropriate [place] for testing.   Current Symptoms:  - Nausea and generally one episode of vomiting each morning. Reviewed non pharm and OTC relief measures for N/V in pregnancy. Advised to alert CNM if N/V worsens, or if Rx medication is needed/desired. Pt does report taking Zofran throughout her last pregnancy.     New OB education done.   No other questions or concerns.     Next Visit:   - Check if taking PNV   - Send ASA Rx, for initiation at 12 weeks   - Review new OB labs   - Review US results   - Do PE with Pap   - Order NIPS/give kit   - Follow up on N/V

## 2024-05-03 NOTE — LETTER
5/3/2024    To Whom It May Concern:    Helene Harris is being followed for her pregnancy at [facility].  Estimated Date of Delivery: 12/24/24    Sincerely,        ASHLEIGH Laboy  [facility]

## 2024-05-05 LAB — BACTERIA UR CULT: NORMAL

## 2024-05-06 ENCOUNTER — TELEPHONE (OUTPATIENT)
Dept: OBSTETRICS AND GYNECOLOGY | Facility: CLINIC | Age: 34
End: 2024-05-06
Payer: COMMERCIAL

## 2024-05-06 DIAGNOSIS — O21.9 NAUSEA AND VOMITING IN PREGNANCY PRIOR TO 22 WEEKS GESTATION (HHS-HCC): Primary | ICD-10-CM

## 2024-05-06 RX ORDER — ONDANSETRON 4 MG/1
4 TABLET, FILM COATED ORAL 2 TIMES DAILY PRN
Qty: 20 TABLET | Refills: 5 | Status: SHIPPED | OUTPATIENT
Start: 2024-05-06 | End: 2024-06-05

## 2024-05-06 NOTE — TELEPHONE ENCOUNTER
----- Message from ASHLEIGH Martin sent at 5/6/2024 11:42 AM EDT -----  Regarding: FW: Morning sickness   Contact: [phone]  Ok I sent her in [place]    Luz Marina  ----- Message -----  From: Rachel Calixto MA  Sent: 5/6/2024   8:06 AM EDT  To: ASHLEIGH Martin  Subject: FW: Morning sickness                             Pt has severe nausea and vomiting. Would you please send something in?    Thank you   ----- Message -----  From: Helene Harris  Sent: 5/5/2024   9:20 PM EDT  [id number]  Subject: Morning sickness                                 Good evening, I have done nothing but throw up for 2 days straight, I've tried the sea bands and peppermint and nothing I was wondering if there was anything I could get prescribed so I can be able to work. Thank you

## 2024-05-07 LAB
HEMOGLOBIN A2: 3.2 % (ref 2–3.5)
HEMOGLOBIN A: 96.4 % (ref 95.8–98)
HEMOGLOBIN F: 0.4 % (ref 0–2)
HEMOGLOBIN IDENTIFICATION INTERPRETATION: NORMAL
PATH REVIEW-HGB IDENTIFICATION: NORMAL

## 2024-05-10 ENCOUNTER — HOSPITAL ENCOUNTER (EMERGENCY)
Facility: HOSPITAL | Age: 34
Discharge: HOME | End: 2024-05-10
Attending: STUDENT IN AN ORGANIZED HEALTH CARE EDUCATION/TRAINING PROGRAM
Payer: COMMERCIAL

## 2024-05-10 ENCOUNTER — OFFICE VISIT (OUTPATIENT)
Dept: PRIMARY CARE | Facility: CLINIC | Age: 34
End: 2024-05-10
Payer: COMMERCIAL

## 2024-05-10 VITALS
BODY MASS INDEX: 33.49 KG/M2 | HEART RATE: 98 BPM | OXYGEN SATURATION: 99 % | WEIGHT: 189 LBS | SYSTOLIC BLOOD PRESSURE: 106 MMHG | DIASTOLIC BLOOD PRESSURE: 68 MMHG | HEIGHT: 63 IN

## 2024-05-10 VITALS
WEIGHT: 188 LBS | OXYGEN SATURATION: 99 % | TEMPERATURE: 97.3 F | SYSTOLIC BLOOD PRESSURE: 102 MMHG | RESPIRATION RATE: 18 BRPM | DIASTOLIC BLOOD PRESSURE: 60 MMHG | HEART RATE: 81 BPM | BODY MASS INDEX: 33.31 KG/M2 | HEIGHT: 63 IN

## 2024-05-10 DIAGNOSIS — Z80.0 FAMILY HISTORY OF THROAT CANCER: ICD-10-CM

## 2024-05-10 DIAGNOSIS — Z83.3 FAMILY HISTORY OF DIABETES MELLITUS: ICD-10-CM

## 2024-05-10 DIAGNOSIS — Z72.0 TOBACCO ABUSE: Primary | ICD-10-CM

## 2024-05-10 DIAGNOSIS — F90.9 ATTENTION DEFICIT HYPERACTIVITY DISORDER (ADHD), UNSPECIFIED ADHD TYPE: ICD-10-CM
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
## 2024-05-10 DIAGNOSIS — Z34.91 FIRST TRIMESTER PREGNANCY (HHS-HCC): ICD-10-CM

## 2024-05-10 LAB
POC FINGERSTICK BLOOD GLUCOSE: 76 MG/DL (ref 70–100)
POC HEMOGLOBIN A1C: 5.4 % (ref 4.2–6.5)

## 2024-05-10 PROCEDURE — 99283 EMERGENCY DEPT VISIT LOW MDM: CPT | Mod: 25

## 2024-05-10 PROCEDURE — 90471 IMMUNIZATION ADMIN: CPT

## 2024-05-10 PROCEDURE — 82962 GLUCOSE BLOOD TEST: CPT | Performed by: FAMILY MEDICINE

## 2024-05-10 PROCEDURE — 2500000004 HC RX 250 GENERAL PHARMACY W/ HCPCS (ALT 636 FOR OP/ED)

## 2024-05-10 PROCEDURE — 99214 OFFICE O/P EST MOD 30 MIN: CPT | Performed by: FAMILY MEDICINE

## 2024-05-10 PROCEDURE — 90715 TDAP VACCINE 7 YRS/> IM: CPT

## 2024-05-10 PROCEDURE — 83036 HEMOGLOBIN GLYCOSYLATED A1C: CPT | Performed by: FAMILY MEDICINE

## 2024-05-10 PROCEDURE — 2500000001 HC RX 250 WO HCPCS SELF ADMINISTERED DRUGS (ALT 637 FOR MEDICARE OP)

## 2024-05-10 RX ORDER — BACITRACIN ZINC 500 UNIT/G
1 OINTMENT IN PACKET (EA) TOPICAL ONCE
Status: COMPLETED | OUTPATIENT
Start: 2024-05-10 | End: 2024-05-10

## 2024-05-10 RX ORDER — DEXTROAMPHETAMINE SACCHARATE, AMPHETAMINE ASPARTATE MONOHYDRATE, DEXTROAMPHETAMINE SULFATE AND AMPHETAMINE SULFATE 7.5; 7.5; 7.5; 7.5 MG/1; MG/1; MG/1; MG/1
30 CAPSULE, EXTENDED RELEASE ORAL EVERY MORNING
Qty: 30 CAPSULE | Refills: 0 | Status: CANCELLED | OUTPATIENT
Start: 2024-07-09 | End: 2024-08-08

## 2024-05-10 RX ORDER — DEXTROAMPHETAMINE SACCHARATE, AMPHETAMINE ASPARTATE MONOHYDRATE, DEXTROAMPHETAMINE SULFATE AND AMPHETAMINE SULFATE 7.5; 7.5; 7.5; 7.5 MG/1; MG/1; MG/1; MG/1
30 CAPSULE, EXTENDED RELEASE ORAL EVERY MORNING
Qty: 30 CAPSULE | Refills: 0 | Status: CANCELLED | OUTPATIENT
Start: 2024-05-10 | End: 2024-06-09

## 2024-05-10 RX ORDER — DEXTROAMPHETAMINE SACCHARATE, AMPHETAMINE ASPARTATE MONOHYDRATE, DEXTROAMPHETAMINE SULFATE AND AMPHETAMINE SULFATE 5; 5; 5; 5 MG/1; MG/1; MG/1; MG/1
20 CAPSULE, EXTENDED RELEASE ORAL EVERY MORNING
Qty: 30 CAPSULE | Refills: 0 | Status: SHIPPED | OUTPATIENT
Start: 2024-05-10 | End: 2024-06-09

## 2024-05-10 RX ORDER — ACETAMINOPHEN 325 MG/1
650 TABLET ORAL ONCE
Status: COMPLETED | OUTPATIENT
Start: 2024-05-10 | End: 2024-05-10

## 2024-05-10 RX ORDER — DEXTROAMPHETAMINE SACCHARATE, AMPHETAMINE ASPARTATE MONOHYDRATE, DEXTROAMPHETAMINE SULFATE AND AMPHETAMINE SULFATE 5; 5; 5; 5 MG/1; MG/1; MG/1; MG/1
20 CAPSULE, EXTENDED RELEASE ORAL EVERY MORNING
Qty: 30 CAPSULE | Refills: 0 | Status: SHIPPED | OUTPATIENT
Start: 2024-07-09 | End: 2024-08-08

## 2024-05-10 RX ORDER — DEXTROAMPHETAMINE SACCHARATE, AMPHETAMINE ASPARTATE MONOHYDRATE, DEXTROAMPHETAMINE SULFATE AND AMPHETAMINE SULFATE 5; 5; 5; 5 MG/1; MG/1; MG/1; MG/1
20 CAPSULE, EXTENDED RELEASE ORAL EVERY MORNING
Qty: 30 CAPSULE | Refills: 0 | Status: SHIPPED | OUTPATIENT
Start: 2024-06-09 | End: 2024-07-09

## 2024-05-10 RX ORDER — DEXTROAMPHETAMINE SACCHARATE, AMPHETAMINE ASPARTATE MONOHYDRATE, DEXTROAMPHETAMINE SULFATE AND AMPHETAMINE SULFATE 7.5; 7.5; 7.5; 7.5 MG/1; MG/1; MG/1; MG/1
30 CAPSULE, EXTENDED RELEASE ORAL EVERY MORNING
Qty: 30 CAPSULE | Refills: 0 | Status: CANCELLED | OUTPATIENT
Start: 2024-06-09 | End: 2024-07-09

## 2024-05-10 RX ORDER — AMOXICILLIN AND CLAVULANATE POTASSIUM 875; 125 MG/1; MG/1
1 TABLET, FILM COATED ORAL ONCE
Status: COMPLETED | OUTPATIENT
Start: 2024-05-10 | End: 2024-05-10

## 2024-05-10 RX ORDER — AMOXICILLIN AND CLAVULANATE POTASSIUM 875; 125 MG/1; MG/1
875 TABLET, FILM COATED ORAL 2 TIMES DAILY
Qty: 27 TABLET | Refills: 0 | Status: SHIPPED | OUTPATIENT
Start: 2024-05-10 | End: 2024-05-24

## 2024-05-10 RX ADMIN — TETANUS TOXOID, REDUCED DIPHTHERIA TOXOID AND ACELLULAR PERTUSSIS VACCINE, ADSORBED 0.5 ML: 5; 2.5; 8; 8; 2.5 SUSPENSION INTRAMUSCULAR at 18:07

## 2024-05-10 RX ADMIN — BACITRACIN 1 APPLICATION: 500 OINTMENT TOPICAL at 18:08

## 2024-05-10 RX ADMIN — ACETAMINOPHEN 650 MG: 325 TABLET ORAL at 18:08

## 2024-05-10 RX ADMIN — AMOXICILLIN AND CLAVULANATE POTASSIUM 1 TABLET: 875; 125 TABLET, FILM COATED ORAL at 18:08

## 2024-05-10 ASSESSMENT — PATIENT HEALTH QUESTIONNAIRE - PHQ9
1. LITTLE INTEREST OR PLEASURE IN DOING THINGS: NOT AT ALL
2. FEELING DOWN, DEPRESSED OR HOPELESS: NOT AT ALL
SUM OF ALL RESPONSES TO PHQ9 QUESTIONS 1 AND 2: 0

## 2024-05-10 ASSESSMENT — ENCOUNTER SYMPTOMS
HEMATURIA: 0
COLOR CHANGE: 0
DYSPHORIC MOOD: 0
DYSURIA: 0
RECTAL PAIN: 0
CONFUSION: 0
NERVOUS/ANXIOUS: 0
APPETITE CHANGE: 0
ADENOPATHY: 0
SINUS PRESSURE: 0
PALPITATIONS: 0
SEIZURES: 0
MYALGIAS: 0
ABDOMINAL PAIN: 0
CONSTITUTIONAL NEGATIVE: 1
CHEST TIGHTNESS: 0
BLOOD IN STOOL: 0
NECK STIFFNESS: 0
SINUS PAIN: 0
POLYPHAGIA: 0
EYE PAIN: 0
ABDOMINAL DISTENTION: 0
TROUBLE SWALLOWING: 0
FLANK PAIN: 0
COUGH: 0
FEVER: 0
RHINORRHEA: 0
HEADACHES: 0
ACTIVITY CHANGE: 0
SPEECH DIFFICULTY: 0
DIARRHEA: 0
SHORTNESS OF BREATH: 0
SORE THROAT: 0
DECREASED CONCENTRATION: 0
SLEEP DISTURBANCE: 0
FATIGUE: 0
DIZZINESS: 0
POLYDIPSIA: 0
CONSTIPATION: 0
STRIDOR: 0
ARTHRALGIAS: 0
PHOTOPHOBIA: 0
AGITATION: 0

## 2024-05-10 ASSESSMENT — LIFESTYLE VARIABLES
EVER HAD A DRINK FIRST THING IN THE MORNING TO STEADY YOUR NERVES TO GET RID OF A HANGOVER: NO
TOTAL SCORE: 0
EVER FELT BAD OR GUILTY ABOUT YOUR DRINKING: NO
HAVE PEOPLE ANNOYED YOU BY CRITICIZING YOUR DRINKING: NO
HAVE YOU EVER FELT YOU SHOULD CUT DOWN ON YOUR DRINKING: NO

## 2024-05-10 ASSESSMENT — PAIN DESCRIPTION - LOCATION: LOCATION: LEG

## 2024-05-10 ASSESSMENT — PAIN - FUNCTIONAL ASSESSMENT
PAIN_FUNCTIONAL_ASSESSMENT: 0-10
PAIN_FUNCTIONAL_ASSESSMENT: 0-10

## 2024-05-10 ASSESSMENT — PAIN SCALES - GENERAL
PAINLEVEL_OUTOF10: 1
PAINLEVEL_OUTOF10: 3

## 2024-05-10 ASSESSMENT — COLUMBIA-SUICIDE SEVERITY RATING SCALE - C-SSRS
1. IN THE PAST MONTH, HAVE YOU WISHED YOU WERE DEAD OR WISHED YOU COULD GO TO SLEEP AND NOT WAKE UP?: NO
2. HAVE YOU ACTUALLY HAD ANY THOUGHTS OF KILLING YOURSELF?: NO
6. HAVE YOU EVER DONE ANYTHING, STARTED TO DO ANYTHING, OR PREPARED TO DO ANYTHING TO END YOUR LIFE?: NO

## 2024-05-10 ASSESSMENT — PAIN DESCRIPTION - ORIENTATION: ORIENTATION: RIGHT;LEFT

## 2024-05-10 NOTE — DISCHARGE INSTRUCTIONS
Follow up with your PCP.  Use augmentin and discuss this medication with your pharmacist.  Return to the ER for any persistent pain, fevers, difficulty walking, numbness or other concerning symptoms.

## 2024-05-10 NOTE — PROGRESS NOTES
"Subjective   Patient ID: Helene Harris is a 33 y.o. female who presents for ADHD.    Patient presents today to follow up on ADHD. Patient states she is 7 weeks pregnant. She did have her initial visit with OB/GYN on 05/03/2024, [place], and was advised she may continue with her current does of Adderall. Patient states medication is working well and denies any concern today.          Review of Systems   Constitutional: Negative.  Negative for activity change, appetite change, fatigue and fever.   HENT:  Negative for congestion, dental problem, ear discharge, ear pain, mouth sores, rhinorrhea, sinus pressure, sinus pain, sore throat, tinnitus and trouble swallowing.    Eyes:  Negative for photophobia, pain and visual disturbance.   Respiratory:  Negative for cough, chest tightness, shortness of breath and stridor.    Cardiovascular:  Negative for chest pain and palpitations.   Gastrointestinal:  Negative for abdominal distention, abdominal pain, blood in stool, constipation, diarrhea and rectal pain.   Endocrine: Negative for cold intolerance, heat intolerance, polydipsia, polyphagia and polyuria.   Genitourinary:  Negative for dysuria, flank pain, hematuria and urgency.   Musculoskeletal:  Negative for arthralgias, gait problem, myalgias and neck stiffness.   Skin:  Negative for color change and rash.   Allergic/Immunologic: Negative for environmental allergies and food allergies.   Neurological:  Negative for dizziness, seizures, syncope, speech difficulty and headaches.   Hematological:  Negative for adenopathy.   Psychiatric/Behavioral:  Negative for agitation, confusion, decreased concentration, dysphoric mood and sleep disturbance. The patient is not nervous/anxious.        Objective   /68 (BP Location: Right arm, Patient Position: Sitting, BP Cuff Size: Adult)   Pulse 98   Ht 1.6 m (5' 3\")   Wt 85.7 kg (189 lb)   LMP 03/19/2024   SpO2 99%   BMI 33.48 kg/m²     Physical Exam  Vitals " reviewed.   Constitutional:       General: She is not in acute distress.     Appearance: She is obese. She is not ill-appearing or diaphoretic.   HENT:      Head: Normocephalic.      Right Ear: Tympanic membrane and external ear normal.      Left Ear: Tympanic membrane and external ear normal.      Nose: Nose normal. No congestion.      Mouth/Throat:      Pharynx: No posterior oropharyngeal erythema.   Eyes:      General:         Right eye: No discharge.         Left eye: No discharge.      Extraocular Movements: Extraocular movements intact.      Conjunctiva/sclera: Conjunctivae normal.      Pupils: Pupils are equal, round, and reactive to light.   Cardiovascular:      Rate and Rhythm: Normal rate and regular rhythm.      Pulses: Normal pulses.      Heart sounds: Normal heart sounds. No murmur heard.  Pulmonary:      Effort: Pulmonary effort is normal. No respiratory distress.      Breath sounds: Normal breath sounds. No wheezing or rales.   Chest:      Chest wall: No tenderness.   Abdominal:      General: Bowel sounds are normal. There is distension.      Palpations: There is no mass.      Tenderness: There is no abdominal tenderness. There is no guarding.   Musculoskeletal:         General: No tenderness. Normal range of motion.      Cervical back: Normal range of motion and neck supple. No tenderness.      Right lower leg: No edema.      Left lower leg: No edema.   Skin:     General: Skin is dry.      Coloration: Skin is not jaundiced.      Findings: No bruising, erythema or rash.   Neurological:      General: No focal deficit present.      Mental Status: She is alert and oriented to person, place, and time. Mental status is at baseline.      Cranial Nerves: No cranial nerve deficit.      Sensory: No sensory deficit.      Coordination: Coordination normal.      Gait: Gait normal.   Psychiatric:         Mood and Affect: Mood normal.         Thought Content: Thought content normal.         Judgment: Judgment  normal.         Assessment/Plan   Problem List Items Addressed This Visit             ICD-10-CM    Attention deficit hyperactivity disorder (ADHD) F90.9    Relevant Medications    amphetamine-dextroamphetamine XR (Adderall XR) 20 mg 24 hr capsule    amphetamine-dextroamphetamine XR (Adderall XR) 20 mg 24 hr capsule (Start on 6/9/2024)    amphetamine-dextroamphetamine XR (Adderall XR) 20 mg 24 hr capsule (Start on 7/9/2024)    Tobacco abuse - Primary Z72.0    Family history of throat cancer Z80.0     Other Visit Diagnoses         Codes    Family history of diabetes mellitus     Z83.3    Relevant Orders    POCT glycosylated hemoglobin (Hb A1C) manually resulted (Completed)    POCT fingerstick glucose manually resulted (Completed)          Scribe Attestation  By signing my name below, I, Chris Davey DO , Camila   attest that this documentation has been prepared under the direction and in the presence of Chris Davey DO.    Provider Attestation - Scribe documentation    All medical record entries made by the Scribe were at my direction and personally dictated by me. I have reviewed the chart and agree that the record accurately reflects my personal performance of the history, physical exam, discussion and plan.

## 2024-05-10 NOTE — ED PROVIDER NOTES
HPI   Chief Complaint   Patient presents with    Animal Bite     Patient states her bosses dog bit her on both lower legs. Patient states boss said dog is up to date with shots       HPI     33-year-old female patient who is 7 weeks pregnant arrives after she was bit by a dog today at 1:30 PM on her right posterior calf, right lateral foot, left lateral posterior calf.  The dog was not foaming at the mouth.  The endoscopy was normal.  The dogs up-to-date on her vaccinations.  She does not know her prior tetanus shot date.   She says that she has a stinging pain in her lower legs.  She denies any vaginal bleeding.  She denies any abdominal pain.  Her OB/GYN is Dr. Grove.             Khad[place] Coma Scale Score: 15                     Patient History   Past Medical History:   Diagnosis Date    ADHD (attention deficit hyperactivity disorder)     Anxiety     Depression     Migraine, unspecified, not intractable, without status migrainosus     Migraine    Personal history of other infectious and parasitic diseases     History of streptococcal infection    Personal history of other mental and behavioral disorders     History of depression    Personal history of other specified conditions     History of lump of left breast    Unspecified asthma, uncomplicated ([facility])     Asthmatic bronchitis     Past Surgical History:   Procedure Laterality Date    MULTIPLE TOOTH EXTRACTIONS Bilateral      Family History   Problem Relation Name Age of Onset    Alcohol abuse Father Jean Machovina     Cancer Father Jeanjorge Lutzovina     Diabetes Father Jean Machovina     Hypertension Maternal Grandfather Pio Lopez SR     Cancer Paternal Grandfather Dwayne Harris     Colon cancer Paternal Grandfather Dwayne Harris     Cancer Mother's Brother Pio Lopez      Social History     Tobacco Use    Smoking status: Every Day     Current packs/day: 0.50     Types: Cigarettes     Passive exposure: Current    Smokeless tobacco: Never  "  Vaping Use    Vaping status: Never Used   Substance Use Topics    Alcohol use: Yes     Comment: I only drink occasionally    Drug use: Not Currently     Types: Marijuana       Physical Exam   ED Triage Vitals [05/10/24 1722]   Temperature Heart Rate Respirations BP   36.3 °C (97.3 °F) 99 18 123/72      Pulse Ox Temp Source Heart Rate Source Patient Position   99 % Temporal Monitor Sitting      BP Location FiO2 (%)     Right arm --       Physical Exam  General: Alert, no acute distress, well developed, Well hydrated  Head: NCAT  Eyes: Clear conjunctiva, EOMI  ENT: Moist mucosa, no lymphadenopathy  Respiratory: Normal respiratory effort. CTAB. Symmetric aeration. No wheezes, rales, or Rhonchi.  CV: Normal rhythm, Normal Rate, pulses present bilaterally  GI: Soft, NT, no guarding, BS normoactive, No distention, No hernia, No palpable mass.  : no flank tenderness, no cva tenderness  MSK: Abrasions and bite marks on her right posterior calf, right lateral foot, left lateral posterior calf.  She is not bleeding at this time.    Skin: Warm, c/d/i  Neuro: AOx3, facial symmetry,   sensorimotor intact in extremities,   CN intact, Nonfocal neurological exam        ED Course & MDM   Diagnoses as of 05/10/24 1851   Dog bite, initial encounter       Medical Decision Making  /60 (BP Location: Left arm, Patient Position: Sitting)   Pulse 81   Temp 36.3 °C (97.3 °F) (Temporal)   Resp 18   Ht 1.6 m (5' 3\")   Wt 85.3 kg (188 lb)   LMP 03/19/2024   SpO2 99%   Breastfeeding Unknown   BMI 33.30 kg/m²      33-year-old female patient who is 7 weeks pregnant arrives after she was bit by a dog today at 1:30 PM on her right posterior calf, right lateral foot, left lateral posterior calf.  She is not bleeding at this time.  The dog was not foaming at the mouth.  The endoscopy was normal.  The dogs up-to-date on her vaccinations.  She does not know her prior tetanus shot date.   She says that she has a stinging pain in her " lower legs.  She denies any vaginal bleeding.  She denies any abdominal pain.  Her OB/GYN is Dr. Grove.  No concern for any septic arthritis or tenosynovitis.  She does not need any stitches at this time.  Tetanus vaccination was given here.  Augmentin given here as well.  Augmentin prescription sent to her pharmacy for her dog bite as she is in her first trimester pregnancy.  bacitracin applied here.  Tylenol given for analgesia.  She was told to follow-up with her PCP.  She was told to discuss Augmentin with her pharmacist.  She was told to return to the ER for any persistent pain, fevers, difficulty walking, numbness or other concerning symptoms.      External Records Reviewed: I reviewed recent and relevant outside records including: prior  Independent Interpretation of Studies: I independently interpreted: As above  Social Determinants Affecting Care: Diagnostic testing considered: As above    I reviewed the case with the attending ER physician. Patient and/or patient´s representative was counseled regarding labs, imaging, likely diagnosis, and plan.     Jimi Morrow MD MS  PGY-1, Emergency Medicine    The above documentation was completed with the use of speech recognition software. It may contain dictation errors secondary to limitations of the software.        Jimi Morrow MD  Resident  05/10/24 2050

## 2024-05-10 NOTE — PATIENT INSTRUCTIONS
Follow up in 3 months     Continue current medications and therapy for chronic medical conditions.    Patient was advised importance of proper diet/nutrition in addition adequate hydration. Patient was encouraged moderate exercise program to include 30 minutes daily for 5 days of the week or 150 minutes weekly. Patient will follow-up with us as scheduled.    I personally reviewed the OARRS report for this patient. I have considered the risks of abuse, dependence, addiction, and diversion.    UDS/CSA: 11/01/2023    Obtain Accu-Chek and hemoglobin A1c    Adjust adderall xr to 20 mg once daily     Patient is 7 weeks pregnant and did discuss with OB/GYN Adderall therapy.  Patient was advised warnings regarding stimulant therapy.  Patient wishes to continue with Adderall therapy at this time

## 2024-05-13 ENCOUNTER — OFFICE VISIT (OUTPATIENT)
Dept: PRIMARY CARE | Facility: CLINIC | Age: 34
End: 2024-05-13
Payer: COMMERCIAL

## 2024-05-13 VITALS
OXYGEN SATURATION: 98 % | BODY MASS INDEX: 33.31 KG/M2 | DIASTOLIC BLOOD PRESSURE: 64 MMHG | RESPIRATION RATE: 16 BRPM | WEIGHT: 188 LBS | HEIGHT: 63 IN | HEART RATE: 99 BPM | SYSTOLIC BLOOD PRESSURE: 100 MMHG | TEMPERATURE: 98 F

## 2024-05-13 DIAGNOSIS — F90.2 ATTENTION DEFICIT HYPERACTIVITY DISORDER (ADHD), COMBINED TYPE: ICD-10-CM
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
## 2024-05-13 DIAGNOSIS — Z34.91 FIRST TRIMESTER PREGNANCY (HHS-HCC): ICD-10-CM

## 2024-05-13 PROCEDURE — 4004F PT TOBACCO SCREEN RCVD TLK: CPT | Performed by: FAMILY MEDICINE

## 2024-05-13 PROCEDURE — 99213 OFFICE O/P EST LOW 20 MIN: CPT | Performed by: FAMILY MEDICINE

## 2024-05-13 ASSESSMENT — ENCOUNTER SYMPTOMS
POLYPHAGIA: 0
SORE THROAT: 0
CONSTITUTIONAL NEGATIVE: 1
EYE PAIN: 0
SINUS PRESSURE: 0
NERVOUS/ANXIOUS: 0
SINUS PAIN: 0
PHOTOPHOBIA: 0
FEVER: 0
SLEEP DISTURBANCE: 0
DYSPHORIC MOOD: 0
ARTHRALGIAS: 0
DIARRHEA: 0
CONSTIPATION: 0
SHORTNESS OF BREATH: 0
DYSURIA: 0
COUGH: 0
NECK STIFFNESS: 0
RHINORRHEA: 0
BLOOD IN STOOL: 0
CHEST TIGHTNESS: 0
ABDOMINAL DISTENTION: 0
RECTAL PAIN: 0
ABDOMINAL PAIN: 0
AGITATION: 0
TROUBLE SWALLOWING: 0
COLOR CHANGE: 0
ADENOPATHY: 0
SPEECH DIFFICULTY: 0
FLANK PAIN: 0
CONFUSION: 0
SEIZURES: 0
APPETITE CHANGE: 0
DECREASED CONCENTRATION: 0
PALPITATIONS: 0
FATIGUE: 0
HEMATURIA: 0
ACTIVITY CHANGE: 0
STRIDOR: 0
DIZZINESS: 0
POLYDIPSIA: 0
MYALGIAS: 0
HEADACHES: 0

## 2024-05-13 NOTE — PATIENT INSTRUCTIONS
Follow up in    Continue current medications and therapy for chronic medical conditions.    Patient was advised importance of proper diet/nutrition in addition adequate hydration. Patient was encouraged moderate exercise program to include 30 minutes daily for 5 days of the week or 150 minutes weekly. Patient will follow-up with us as scheduled.    I personally reviewed the OARRS report for this patient. I have considered the risks of abuse, dependence, addiction, and diversion.    UDS/CSA:    Tetanus vaccine administered in emergency room    Complete antibiotic therapy    Keep wound clean with soap and water twice daily

## 2024-05-13 NOTE — PROGRESS NOTES
"Subjective   Patient ID: Helene Harris is a 33 y.o. female who presents for Animal Bite.    Patient states she was bitten by her bosses dog on Friday at 1:30 pm. Patient states the dog bite her 3 times on both of her calf's. Patient states was seen at [facility] on 05/10/2024. Patient was given a tetanus shot and antibiotic.    Patient denies any symptoms or concerns today.         Review of Systems   Constitutional: Negative.  Negative for activity change, appetite change, fatigue and fever.   HENT:  Negative for congestion, dental problem, ear discharge, ear pain, mouth sores, rhinorrhea, sinus pressure, sinus pain, sore throat, tinnitus and trouble swallowing.    Eyes:  Negative for photophobia, pain and visual disturbance.   Respiratory:  Negative for cough, chest tightness, shortness of breath and stridor.    Cardiovascular:  Negative for chest pain and palpitations.   Gastrointestinal:  Negative for abdominal distention, abdominal pain, blood in stool, constipation, diarrhea and rectal pain.   Endocrine: Negative for cold intolerance, heat intolerance, polydipsia, polyphagia and polyuria.   Genitourinary:  Negative for dysuria, flank pain, hematuria and urgency.   Musculoskeletal:  Negative for arthralgias, gait problem, myalgias and neck stiffness.   Skin:  Negative for color change and rash.   Allergic/Immunologic: Negative for environmental allergies and food allergies.   Neurological:  Negative for dizziness, seizures, syncope, speech difficulty and headaches.   Hematological:  Negative for adenopathy.   Psychiatric/Behavioral:  Negative for agitation, confusion, decreased concentration, dysphoric mood and sleep disturbance. The patient is not nervous/anxious.        Objective   /64 (BP Location: Right arm, Patient Position: Sitting, BP Cuff Size: Adult)   Pulse 99   Temp 36.7 °C (98 °F)   Resp 16   Ht 1.6 m (5' 3\")   Wt 85.3 kg (188 lb)   LMP 03/19/2024   SpO2 98%   BMI 33.30 kg/m² "     Physical Exam  Vitals reviewed.   Constitutional:       General: She is not in acute distress.     Appearance: Normal appearance. She is normal weight. She is not ill-appearing or diaphoretic.   HENT:      Head: Normocephalic.      Right Ear: Tympanic membrane and external ear normal.      Left Ear: Tympanic membrane and external ear normal.      Nose: Nose normal. No congestion.      Mouth/Throat:      Pharynx: No posterior oropharyngeal erythema.   Eyes:      General:         Right eye: No discharge.         Left eye: No discharge.      Extraocular Movements: Extraocular movements intact.      Conjunctiva/sclera: Conjunctivae normal.      Pupils: Pupils are equal, round, and reactive to light.   Cardiovascular:      Rate and Rhythm: Normal rate and regular rhythm.      Pulses: Normal pulses.      Heart sounds: Normal heart sounds. No murmur heard.  Pulmonary:      Effort: Pulmonary effort is normal. No respiratory distress.      Breath sounds: Normal breath sounds. No wheezing or rales.   Chest:      Chest wall: No tenderness.   Abdominal:      General: Abdomen is flat. Bowel sounds are normal. There is no distension.      Palpations: There is no mass.      Tenderness: There is no abdominal tenderness. There is no guarding.   Musculoskeletal:         General: No tenderness. Normal range of motion.      Cervical back: Normal range of motion and neck supple. No tenderness.      Right lower leg: No edema.      Left lower leg: No edema.   Skin:     General: Skin is dry.      Coloration: Skin is not jaundiced.      Findings: Erythema present. No bruising or rash.      Comments: Abrasions to lateral calves with mild bruising.  No active bleeding   Neurological:      General: No focal deficit present.      Mental Status: She is alert and oriented to person, place, and time. Mental status is at baseline.      Cranial Nerves: No cranial nerve deficit.      Sensory: No sensory deficit.      Coordination: Coordination  normal.      Gait: Gait normal.   Psychiatric:         Mood and Affect: Mood normal.         Thought Content: Thought content normal.         Judgment: Judgment normal.         Assessment/Plan   Problem List Items Addressed This Visit             ICD-10-CM    Attention deficit hyperactivity disorder (ADHD) F90.9     Other Visit Diagnoses         Codes    Dog bite, initial encounter    -  Primary W54.0XXA    First trimester pregnancy ([facility])     Z34.91

## 2024-05-28 PROBLEM — Z80.0 FAMILY HISTORY OF THROAT CANCER: Status: RESOLVED | Noted: 2024-01-16 | Resolved: 2024-05-28

## 2024-05-28 PROBLEM — O09.529 ANTEPARTUM MULTIGRAVIDA OF ADVANCED MATERNAL AGE (HHS-HCC): Status: ACTIVE | Noted: 2024-05-28

## 2024-05-28 PROBLEM — Z34.80 PRENATAL CARE OF MULTIGRAVIDA, ANTEPARTUM (HHS-HCC): Status: ACTIVE | Noted: 2024-05-28

## 2024-05-28 PROBLEM — O09.299 HISTORY OF MACROSOMIA IN INFANT IN PRIOR PREGNANCY, CURRENTLY PREGNANT (HHS-HCC): Status: ACTIVE | Noted: 2024-05-28

## 2024-05-29 ENCOUNTER — ROUTINE PRENATAL (OUTPATIENT)
Dept: OBSTETRICS AND GYNECOLOGY | Facility: CLINIC | Age: 34
End: 2024-05-29
Payer: COMMERCIAL

## 2024-05-29 VITALS — SYSTOLIC BLOOD PRESSURE: 120 MMHG | WEIGHT: 187.1 LBS | DIASTOLIC BLOOD PRESSURE: 72 MMHG | BODY MASS INDEX: 33.14 KG/M2

## 2024-05-29 DIAGNOSIS — O21.9 NAUSEA AND VOMITING IN PREGNANCY (HHS-HCC): Primary | ICD-10-CM

## 2024-05-29 DIAGNOSIS — Z34.80 PRENATAL CARE OF MULTIGRAVIDA, ANTEPARTUM (HHS-HCC): ICD-10-CM

## 2024-05-29 DIAGNOSIS — O09.529 ANTEPARTUM MULTIGRAVIDA OF ADVANCED MATERNAL AGE (HHS-HCC): ICD-10-CM

## 2024-05-29 DIAGNOSIS — Z3A.10 10 WEEKS GESTATION OF PREGNANCY (HHS-HCC): ICD-10-CM

## 2024-05-29 DIAGNOSIS — F90.2 ATTENTION DEFICIT HYPERACTIVITY DISORDER (ADHD), COMBINED TYPE: ICD-10-CM

## 2024-05-29 PROCEDURE — 87624 HPV HI-RISK TYP POOLED RSLT: CPT

## 2024-05-29 PROCEDURE — 88175 CYTOPATH C/V AUTO FLUID REDO: CPT

## 2024-05-29 PROCEDURE — 87661 TRICHOMONAS VAGINALIS AMPLIF: CPT

## 2024-05-29 PROCEDURE — 87491 CHLMYD TRACH DNA AMP PROBE: CPT

## 2024-05-29 PROCEDURE — 87591 N.GONORRHOEAE DNA AMP PROB: CPT

## 2024-05-29 PROCEDURE — 99214 OFFICE O/P EST MOD 30 MIN: CPT | Performed by: ADVANCED PRACTICE MIDWIFE

## 2024-05-29 RX ORDER — ASPIRIN 81 MG/1
81 TABLET ORAL 2 TIMES DAILY
Qty: 60 TABLET | Refills: 11 | Status: SHIPPED | OUTPATIENT
Start: 2024-05-29 | End: 2025-05-29

## 2024-05-29 NOTE — PROGRESS NOTES
Taking gummy PNV without difficulties.   Denies s/s of UTI.   1 lb. weight loss since previous visit.   Reports N/V continues to come and go, though is overall improved from prior visit. Takes Zofran PRN, generally 3 days per week.   Seen in ER on 5/10 after being bit 3 times on her legs by her boss's dog. Is on PO antibiotics, which she is taking as directed. Encouraged to complete all medication. Bite sites have healed.   Pt desires NIPS testing. Discussed appropriate time and GA for testing. Advised must have US done first to confirm GA. If ANUSHA changes, pt will contact office for new NIPS order.   Dating/NT US scheduled 6/18.   Discussed initiation of  mg daily, at 12 weeks gestation. Rx sent to the pharmacy, to initiate in 2 weeks.   Reviewed new OB lab results- all WNL.   GC/CT, Trich noted to be missing, therefore sent from pap.   OB PE completed.   Pap done.   Saw Dr Davey (PCP) and received Rx for Adderall to have if needed. Plans to take PRN, only when symptoms significantly exacerbate. Has taken twice since receiving Rx and reports vomiting after taking. Discussed following up with Dr Davey, as directed.   No other questions or concerns.     Vitals:    05/29/24 0951   Weight: 84.9 kg (187 lb 1.6 oz)       NEXT VISIT PLAN:   - Follow up on N/V and Zofran use  - Review NIPS result   - Review dating/NT US  - Check if taking ASA   - Review GC/CT, Trich results (not done initially)   - Review pap results             ANNA Laboy-ZENAIDA

## 2024-05-30 LAB
C TRACH RRNA SPEC QL NAA+PROBE: NEGATIVE
N GONORRHOEA DNA SPEC QL PROBE+SIG AMP: NEGATIVE
T VAGINALIS RRNA SPEC QL NAA+PROBE: NEGATIVE

## 2024-06-10 LAB
CYTOLOGY CMNT CVX/VAG CYTO-IMP: NORMAL
HPV HR 12 DNA GENITAL QL NAA+PROBE: NEGATIVE
HPV HR GENOTYPES PNL CVX NAA+PROBE: NEGATIVE
HPV16 DNA SPEC QL NAA+PROBE: NEGATIVE
HPV18 DNA SPEC QL NAA+PROBE: NEGATIVE
LAB AP HPV GENOTYPE QUESTION: YES
LAB AP HPV HR: NORMAL
LAB AP PAP ADDITIONAL TESTS: NORMAL
LABORATORY COMMENT REPORT: NORMAL
PATH REPORT.TOTAL CANCER: NORMAL

## 2024-06-13 DIAGNOSIS — F90.9 ATTENTION DEFICIT HYPERACTIVITY DISORDER (ADHD), UNSPECIFIED ADHD TYPE: ICD-10-CM

## 2024-06-14 ENCOUNTER — APPOINTMENT (OUTPATIENT)
Dept: RADIOLOGY | Facility: HOSPITAL | Age: 34
End: 2024-06-14
Payer: COMMERCIAL

## 2024-06-14 ENCOUNTER — APPOINTMENT (OUTPATIENT)
Dept: CARDIOLOGY | Facility: HOSPITAL | Age: 34
End: 2024-06-14
Payer: COMMERCIAL

## 2024-06-14 ENCOUNTER — HOSPITAL ENCOUNTER (EMERGENCY)
Facility: HOSPITAL | Age: 34
Discharge: HOME | End: 2024-06-14
Attending: EMERGENCY MEDICINE
Payer: COMMERCIAL

## 2024-06-14 VITALS
BODY MASS INDEX: 33.16 KG/M2 | SYSTOLIC BLOOD PRESSURE: 113 MMHG | TEMPERATURE: 97.3 F | DIASTOLIC BLOOD PRESSURE: 66 MMHG | HEIGHT: 63 IN | HEART RATE: 86 BPM | OXYGEN SATURATION: 98 % | RESPIRATION RATE: 18 BRPM | WEIGHT: 187.17 LBS

## 2024-06-14 LAB
ALBUMIN SERPL BCP-MCNC: 3.7 G/DL (ref 3.4–5)
ALP SERPL-CCNC: 49 U/L (ref 33–110)
ALT SERPL W P-5'-P-CCNC: 10 U/L (ref 7–45)
ANION GAP SERPL CALC-SCNC: 11 MMOL/L (ref 10–20)
AST SERPL W P-5'-P-CCNC: 12 U/L (ref 9–39)
BASOPHILS # BLD AUTO: 0.06 X10*3/UL (ref 0–0.1)
BASOPHILS NFR BLD AUTO: 0.5 %
BILIRUB SERPL-MCNC: 0.2 MG/DL (ref 0–1.2)
BUN SERPL-MCNC: 5 MG/DL (ref 6–23)
CALCIUM SERPL-MCNC: 8.9 MG/DL (ref 8.6–10.3)
CARDIAC TROPONIN I PNL SERPL HS: <3 NG/L (ref 0–13)
CHLORIDE SERPL-SCNC: 101 MMOL/L (ref 98–107)
CO2 SERPL-SCNC: 23 MMOL/L (ref 21–32)
CREAT SERPL-MCNC: 0.62 MG/DL (ref 0.5–1.05)
EGFRCR SERPLBLD CKD-EPI 2021: >90 ML/MIN/1.73M*2
EOSINOPHIL # BLD AUTO: 0.14 X10*3/UL (ref 0–0.7)
EOSINOPHIL NFR BLD AUTO: 1.2 %
ERYTHROCYTE [DISTWIDTH] IN BLOOD BY AUTOMATED COUNT: 12.4 % (ref 11.5–14.5)
GLUCOSE SERPL-MCNC: 100 MG/DL (ref 74–99)
HCT VFR BLD AUTO: 38.3 % (ref 36–46)
HGB BLD-MCNC: 13.5 G/DL (ref 12–16)
IMM GRANULOCYTES # BLD AUTO: 0.05 X10*3/UL (ref 0–0.7)
IMM GRANULOCYTES NFR BLD AUTO: 0.4 % (ref 0–0.9)
LIPASE SERPL-CCNC: 34 U/L (ref 9–82)
LYMPHOCYTES # BLD AUTO: 2.98 X10*3/UL (ref 1.2–4.8)
LYMPHOCYTES NFR BLD AUTO: 25.5 %
MCH RBC QN AUTO: 32.1 PG (ref 26–34)
MCHC RBC AUTO-ENTMCNC: 35.2 G/DL (ref 32–36)
MCV RBC AUTO: 91 FL (ref 80–100)
MONOCYTES # BLD AUTO: 0.63 X10*3/UL (ref 0.1–1)
MONOCYTES NFR BLD AUTO: 5.4 %
NEUTROPHILS # BLD AUTO: 7.84 X10*3/UL (ref 1.2–7.7)
NEUTROPHILS NFR BLD AUTO: 67 %
NRBC BLD-RTO: 0 /100 WBCS (ref 0–0)
PLATELET # BLD AUTO: 289 X10*3/UL (ref 150–450)
POTASSIUM SERPL-SCNC: 3.7 MMOL/L (ref 3.5–5.3)
PROT SERPL-MCNC: 6.6 G/DL (ref 6.4–8.2)
RBC # BLD AUTO: 4.21 X10*6/UL (ref 4–5.2)
SODIUM SERPL-SCNC: 131 MMOL/L (ref 136–145)
WBC # BLD AUTO: 11.7 X10*3/UL (ref 4.4–11.3)

## 2024-06-14 PROCEDURE — 99281 EMR DPT VST MAYX REQ PHY/QHP: CPT

## 2024-06-14 PROCEDURE — 93005 ELECTROCARDIOGRAM TRACING: CPT

## 2024-06-14 PROCEDURE — 83690 ASSAY OF LIPASE: CPT | Performed by: EMERGENCY MEDICINE

## 2024-06-14 PROCEDURE — 85025 COMPLETE CBC W/AUTO DIFF WBC: CPT | Performed by: STUDENT IN AN ORGANIZED HEALTH CARE EDUCATION/TRAINING PROGRAM

## 2024-06-14 PROCEDURE — 84484 ASSAY OF TROPONIN QUANT: CPT | Performed by: STUDENT IN AN ORGANIZED HEALTH CARE EDUCATION/TRAINING PROGRAM

## 2024-06-14 PROCEDURE — 85025 COMPLETE CBC W/AUTO DIFF WBC: CPT | Performed by: EMERGENCY MEDICINE

## 2024-06-14 PROCEDURE — 80053 COMPREHEN METABOLIC PANEL: CPT | Performed by: STUDENT IN AN ORGANIZED HEALTH CARE EDUCATION/TRAINING PROGRAM

## 2024-06-14 PROCEDURE — 4500999001 HC ED NO CHARGE

## 2024-06-14 PROCEDURE — 84484 ASSAY OF TROPONIN QUANT: CPT | Performed by: EMERGENCY MEDICINE

## 2024-06-14 PROCEDURE — 80053 COMPREHEN METABOLIC PANEL: CPT | Performed by: EMERGENCY MEDICINE

## 2024-06-14 ASSESSMENT — PAIN DESCRIPTION - FREQUENCY: FREQUENCY: CONSTANT/CONTINUOUS

## 2024-06-14 ASSESSMENT — PAIN DESCRIPTION - LOCATION: LOCATION: CHEST

## 2024-06-14 ASSESSMENT — PAIN DESCRIPTION - ONSET: ONSET: GRADUAL

## 2024-06-14 ASSESSMENT — COLUMBIA-SUICIDE SEVERITY RATING SCALE - C-SSRS
2. HAVE YOU ACTUALLY HAD ANY THOUGHTS OF KILLING YOURSELF?: NO
6. HAVE YOU EVER DONE ANYTHING, STARTED TO DO ANYTHING, OR PREPARED TO DO ANYTHING TO END YOUR LIFE?: NO
1. IN THE PAST MONTH, HAVE YOU WISHED YOU WERE DEAD OR WISHED YOU COULD GO TO SLEEP AND NOT WAKE UP?: NO

## 2024-06-14 ASSESSMENT — PAIN DESCRIPTION - DESCRIPTORS
DESCRIPTORS: PRESSURE;SHARP
DESCRIPTORS: SHARP;PRESSURE

## 2024-06-14 ASSESSMENT — PAIN DESCRIPTION - PAIN TYPE: TYPE: ACUTE PAIN

## 2024-06-14 ASSESSMENT — PAIN DESCRIPTION - PROGRESSION: CLINICAL_PROGRESSION: GRADUALLY WORSENING

## 2024-06-14 ASSESSMENT — PAIN DESCRIPTION - DIRECTION: RADIATING_TOWARDS: STOMACH

## 2024-06-14 ASSESSMENT — PAIN - FUNCTIONAL ASSESSMENT: PAIN_FUNCTIONAL_ASSESSMENT: 0-10

## 2024-06-14 ASSESSMENT — PAIN SCALES - GENERAL: PAINLEVEL_OUTOF10: 5 - MODERATE PAIN

## 2024-06-14 ASSESSMENT — PAIN DESCRIPTION - ORIENTATION: ORIENTATION: MID

## 2024-06-15 LAB
ATRIAL RATE: 86 BPM
P AXIS: 48 DEGREES
P OFFSET: 204 MS
P ONSET: 152 MS
PR INTERVAL: 138 MS
Q ONSET: 221 MS
QRS COUNT: 14 BEATS
QRS DURATION: 76 MS
QT INTERVAL: 366 MS
QTC CALCULATION(BAZETT): 437 MS
QTC FREDERICIA: 412 MS
R AXIS: 70 DEGREES
T AXIS: 37 DEGREES
T OFFSET: 404 MS
VENTRICULAR RATE: 86 BPM

## 2024-06-15 RX ORDER — DEXTROAMPHETAMINE SACCHARATE, AMPHETAMINE ASPARTATE MONOHYDRATE, DEXTROAMPHETAMINE SULFATE AND AMPHETAMINE SULFATE 5; 5; 5; 5 MG/1; MG/1; MG/1; MG/1
20 CAPSULE, EXTENDED RELEASE ORAL EVERY MORNING
Qty: 30 CAPSULE | Refills: 0 | Status: SHIPPED | OUTPATIENT
Start: 2024-06-15 | End: 2024-07-15

## 2024-06-16 ENCOUNTER — HOSPITAL ENCOUNTER (EMERGENCY)
Facility: HOSPITAL | Age: 34
Discharge: OTHER INSTITUTION | End: 2024-06-16
Payer: COMMERCIAL

## 2024-06-16 VITALS
RESPIRATION RATE: 20 BRPM | SYSTOLIC BLOOD PRESSURE: 114 MMHG | TEMPERATURE: 97 F | BODY MASS INDEX: 33.13 KG/M2 | OXYGEN SATURATION: 96 % | HEART RATE: 82 BPM | WEIGHT: 187 LBS | HEIGHT: 63 IN | DIASTOLIC BLOOD PRESSURE: 69 MMHG

## 2024-06-16 DIAGNOSIS — O21.9 VOMITING DURING PREGNANCY (HHS-HCC): Primary | ICD-10-CM

## 2024-06-16 PROCEDURE — 2500000005 HC RX 250 GENERAL PHARMACY W/O HCPCS: Performed by: PHYSICIAN ASSISTANT

## 2024-06-16 PROCEDURE — 2500000004 HC RX 250 GENERAL PHARMACY W/ HCPCS (ALT 636 FOR OP/ED): Performed by: PHYSICIAN ASSISTANT

## 2024-06-16 PROCEDURE — 96372 THER/PROPH/DIAG INJ SC/IM: CPT | Performed by: PHYSICIAN ASSISTANT

## 2024-06-16 PROCEDURE — 99283 EMERGENCY DEPT VISIT LOW MDM: CPT

## 2024-06-16 RX ORDER — DIPHENHYDRAMINE HYDROCHLORIDE 50 MG/ML
25 INJECTION INTRAMUSCULAR; INTRAVENOUS ONCE
Status: COMPLETED | OUTPATIENT
Start: 2024-06-16 | End: 2024-06-16

## 2024-06-16 RX ORDER — ONDANSETRON 4 MG/1
4 TABLET, ORALLY DISINTEGRATING ORAL ONCE
Status: COMPLETED | OUTPATIENT
Start: 2024-06-16 | End: 2024-06-16

## 2024-06-16 RX ORDER — METOCLOPRAMIDE HYDROCHLORIDE 5 MG/ML
10 INJECTION INTRAMUSCULAR; INTRAVENOUS ONCE
Status: DISCONTINUED | OUTPATIENT
Start: 2024-06-16 | End: 2024-06-16

## 2024-06-16 RX ORDER — METOCLOPRAMIDE HYDROCHLORIDE 5 MG/ML
10 INJECTION INTRAMUSCULAR; INTRAVENOUS ONCE
Status: COMPLETED | OUTPATIENT
Start: 2024-06-16 | End: 2024-06-16

## 2024-06-16 ASSESSMENT — LIFESTYLE VARIABLES
TOTAL SCORE: 0
HAVE YOU EVER FELT YOU SHOULD CUT DOWN ON YOUR DRINKING: NO
HAVE PEOPLE ANNOYED YOU BY CRITICIZING YOUR DRINKING: NO
EVER FELT BAD OR GUILTY ABOUT YOUR DRINKING: NO
EVER HAD A DRINK FIRST THING IN THE MORNING TO STEADY YOUR NERVES TO GET RID OF A HANGOVER: NO

## 2024-06-16 NOTE — ED PROVIDER NOTES
HPI   Chief Complaint   Patient presents with    Flu Symptoms     Has been vomiting since last  night and can't keep anything down         History provided by:  Patient   used: No         33-year-old female presents with vomiting since last night and she is 12 weeks pregnant.  No one else sick around her.  Denies vaginal bleeding.  Denies fever, dizziness, headache, diarrhea, abd or back pain, dysuria, SOB or CP    ROS negative unless otherwise stated in HPI                   Khadra Coma Scale Score: 15                     Patient History   Past Medical History:   Diagnosis Date    ADHD (attention deficit hyperactivity disorder)     Anxiety     Depression     Family history of throat cancer 01/16/2024    Father    Migraine, unspecified, not intractable, without status migrainosus     Migraine    Personal history of other infectious and parasitic diseases     History of streptococcal infection    Personal history of other mental and behavioral disorders     History of depression    Personal history of other specified conditions     History of lump of left breast    Unspecified asthma, uncomplicated ([facility])     Asthmatic bronchitis     Past Surgical History:   Procedure Laterality Date    MULTIPLE TOOTH EXTRACTIONS Bilateral      Family History   Problem Relation Name Age of Onset    Alcohol abuse Father Jeanjorge Harris     Cancer Father Jeanjorge Harris     Diabetes Father Jean Harris     Hypertension Maternal Grandfather Pio Lopez SR     Cancer Paternal Grandfather Dwayne Harris     Colon cancer Paternal Grandfather Dwayne Harris     Cancer Mother's Brother Pio Lopez      Social History     Tobacco Use    Smoking status: Every Day     Current packs/day: 0.50     Types: Cigarettes     Passive exposure: Current    Smokeless tobacco: Never   Vaping Use    Vaping status: Never Used   Substance Use Topics    Alcohol use: Yes     Comment: I only drink occasionally    Drug  use: Not Currently     Types: Marijuana       Physical Exam   ED Triage Vitals [06/16/24 0959]   Temperature Heart Rate Respirations BP   36.1 °C (97 °F) 82 20 114/69      Pulse Ox Temp Source Heart Rate Source Patient Position   96 % Tympanic Monitor Sitting      BP Location FiO2 (%)     Right arm --       Physical Exam    General: alert, no distress, well nourished, talking in full and complete sentences  Skin: dry, intact, no rashes  Head: atraumatic  Eyes: EOMI, PERRLA, normal conjunctiva  Throat: no stridor, no hot potato voice  Nose: nares patent  Mouth: mucous membranes moist  Neck: supple  Respiratory: non labored breathing  Abd: soft, NT, normal BS, no peritoneal signs  Spine: no CVA tenderness  Extremities: FROM X4, strength +5/5,  capillary refill intact  Neuro: A&Ox3  Psych: cooperative, appropriate mood      ED Course & MDM   Diagnoses as of 06/16/24 1150   Vomiting during pregnancy ([facility])        No orders to display         Medical Decision Making  Patient will be given Zofran.  Patient is still nauseous and will add on Benadryl and Reglan IM.  Nurse informs me that patient is no longer in the room when she is not in the surrounding areas and assumed that she left without telling anyone.  She did not provide a UA.    Procedure  Procedures     Lisset Martinez PA-C  06/16/24 1151

## 2024-06-17 ENCOUNTER — TELEPHONE (OUTPATIENT)
Dept: PRIMARY CARE | Facility: CLINIC | Age: 34
End: 2024-06-17
Payer: COMMERCIAL

## 2024-06-17 NOTE — TELEPHONE ENCOUNTER
Dr. Davey patient    Called to check on patient after her ER visit. She wanted to let BERE know that she is still experiencing some nausea and dizziness and doesn't know if this is associated with her pregnancy. Other than that she is fine.    Please advise, thank you.

## 2024-06-18 ENCOUNTER — HOSPITAL ENCOUNTER (OUTPATIENT)
Dept: RADIOLOGY | Facility: CLINIC | Age: 34
Discharge: HOME | End: 2024-06-18
Payer: COMMERCIAL

## 2024-06-18 DIAGNOSIS — Z34.81 PRENATAL CARE, SUBSEQUENT PREGNANCY, FIRST TRIMESTER (HHS-HCC): ICD-10-CM

## 2024-06-18 DIAGNOSIS — Z3A.01 6 WEEKS GESTATION OF PREGNANCY (HHS-HCC): ICD-10-CM

## 2024-06-18 PROCEDURE — 76813 OB US NUCHAL MEAS 1 GEST: CPT | Performed by: MEDICAL GENETICS

## 2024-06-18 PROCEDURE — 76801 OB US < 14 WKS SINGLE FETUS: CPT | Performed by: MEDICAL GENETICS

## 2024-06-18 PROCEDURE — 76801 OB US < 14 WKS SINGLE FETUS: CPT

## 2024-06-18 PROCEDURE — 76813 OB US NUCHAL MEAS 1 GEST: CPT

## 2024-06-19 ENCOUNTER — LAB (OUTPATIENT)
Dept: LAB | Facility: LAB | Age: 34
End: 2024-06-19
Payer: COMMERCIAL

## 2024-06-25 LAB — SCAN RESULT: NORMAL

## 2024-06-26 ENCOUNTER — APPOINTMENT (OUTPATIENT)
Dept: OBSTETRICS AND GYNECOLOGY | Facility: CLINIC | Age: 34
End: 2024-06-26
Payer: COMMERCIAL

## 2024-06-26 VITALS — BODY MASS INDEX: 33.16 KG/M2 | SYSTOLIC BLOOD PRESSURE: 101 MMHG | WEIGHT: 187.2 LBS | DIASTOLIC BLOOD PRESSURE: 71 MMHG

## 2024-06-26 DIAGNOSIS — O09.529 ANTEPARTUM MULTIGRAVIDA OF ADVANCED MATERNAL AGE (HHS-HCC): ICD-10-CM

## 2024-06-26 DIAGNOSIS — Z3A.14 14 WEEKS GESTATION OF PREGNANCY (HHS-HCC): ICD-10-CM

## 2024-06-26 DIAGNOSIS — O21.9 NAUSEA AND VOMITING IN PREGNANCY PRIOR TO 22 WEEKS GESTATION (HHS-HCC): ICD-10-CM

## 2024-06-26 DIAGNOSIS — Z34.80 PRENATAL CARE OF MULTIGRAVIDA, ANTEPARTUM (HHS-HCC): Primary | ICD-10-CM

## 2024-06-26 DIAGNOSIS — O09.299 HISTORY OF MACROSOMIA IN INFANT IN PRIOR PREGNANCY, CURRENTLY PREGNANT (HHS-HCC): ICD-10-CM

## 2024-06-26 PROCEDURE — 99214 OFFICE O/P EST MOD 30 MIN: CPT | Performed by: MIDWIFE

## 2024-06-26 NOTE — PROGRESS NOTES
S: JUAN Denies sol/rom. Still experiencing n/v and reports improvement with zofran. Taking her asa as directed. Reports waves of dizziness/lightheadedness that comes and goes. Presented to [facility]  for c/o chest pain. States it was due to her anxiety. She verbalizes a h/o borderline personality disorder and bipolar disorder. Does not take any meds because she did not like feeling like a zombie. THC is the only thing that helps improve her symptoms. Aware of the recommendations against use in pregnancy.    O: See flow sheets    A: 32yo  at 14.1 per LMP c/w 13 week US       AMA    P: Reviewed BP, weight      Reviewed negative GC/CT, Trich      Discussed/reviewed Pap: WNL! Rescreen in  per guidelines      Encouraged hydration during these hot humid days!      Discussed/reviewed rrNIPS --> It's a boy!      Recommended [facility] referral. Patient declines at this time      Discussed comfort measures for anxiety, safe alternatives in pregnancy. Patient aware to reach out if she decides to pursue counseling/meds       Anatomy scan scheduled      RTO in 4 weeks and sooner PRN

## 2024-07-17 DIAGNOSIS — F90.9 ATTENTION DEFICIT HYPERACTIVITY DISORDER (ADHD), UNSPECIFIED ADHD TYPE: ICD-10-CM

## 2024-07-20 RX ORDER — DEXTROAMPHETAMINE SACCHARATE, AMPHETAMINE ASPARTATE MONOHYDRATE, DEXTROAMPHETAMINE SULFATE AND AMPHETAMINE SULFATE 5; 5; 5; 5 MG/1; MG/1; MG/1; MG/1
20 CAPSULE, EXTENDED RELEASE ORAL EVERY MORNING
Qty: 30 CAPSULE | Refills: 0 | OUTPATIENT
Start: 2024-07-20 | End: 2024-08-19

## 2024-07-24 ENCOUNTER — APPOINTMENT (OUTPATIENT)
Dept: OBSTETRICS AND GYNECOLOGY | Facility: CLINIC | Age: 34
End: 2024-07-24
Payer: COMMERCIAL

## 2024-07-26 ENCOUNTER — APPOINTMENT (OUTPATIENT)
Dept: OBSTETRICS AND GYNECOLOGY | Facility: CLINIC | Age: 34
End: 2024-07-26
Payer: COMMERCIAL

## 2024-08-20 ENCOUNTER — APPOINTMENT (OUTPATIENT)
Dept: PRIMARY CARE | Facility: CLINIC | Age: 34
End: 2024-08-20
Payer: COMMERCIAL

## 2024-08-23 ENCOUNTER — APPOINTMENT (OUTPATIENT)
Dept: OBSTETRICS AND GYNECOLOGY | Facility: CLINIC | Age: 34
End: 2024-08-23
Payer: COMMERCIAL

## 2024-09-20 ENCOUNTER — APPOINTMENT (OUTPATIENT)
Dept: OBSTETRICS AND GYNECOLOGY | Facility: CLINIC | Age: 34
End: 2024-09-20
Payer: COMMERCIAL

## 2024-09-22 ENCOUNTER — HOSPITAL ENCOUNTER (EMERGENCY)
Facility: HOSPITAL | Age: 34
Discharge: STILL ADMITTED OR ADMINISTRATIVE | End: 2024-09-22
Payer: COMMERCIAL

## 2024-09-22 ENCOUNTER — HOSPITAL ENCOUNTER (OUTPATIENT)
Facility: HOSPITAL | Age: 34
Discharge: HOME | End: 2024-09-22
Attending: STUDENT IN AN ORGANIZED HEALTH CARE EDUCATION/TRAINING PROGRAM | Admitting: STUDENT IN AN ORGANIZED HEALTH CARE EDUCATION/TRAINING PROGRAM
Payer: COMMERCIAL

## 2024-09-22 ENCOUNTER — HOSPITAL ENCOUNTER (OUTPATIENT)
Facility: HOSPITAL | Age: 34
End: 2024-09-22
Attending: STUDENT IN AN ORGANIZED HEALTH CARE EDUCATION/TRAINING PROGRAM | Admitting: STUDENT IN AN ORGANIZED HEALTH CARE EDUCATION/TRAINING PROGRAM
Payer: COMMERCIAL

## 2024-09-22 VITALS
OXYGEN SATURATION: 97 % | DIASTOLIC BLOOD PRESSURE: 66 MMHG | HEART RATE: 86 BPM | BODY MASS INDEX: 33.55 KG/M2 | HEIGHT: 63 IN | TEMPERATURE: 98.1 F | WEIGHT: 189.38 LBS | SYSTOLIC BLOOD PRESSURE: 112 MMHG | RESPIRATION RATE: 16 BRPM

## 2024-09-22 DIAGNOSIS — B37.31 VULVOVAGINAL CANDIDIASIS: Primary | ICD-10-CM

## 2024-09-22 LAB
APPEARANCE UR: ABNORMAL
BACTERIA #/AREA URNS AUTO: ABNORMAL /HPF
BILIRUB UR STRIP.AUTO-MCNC: NEGATIVE MG/DL
COLOR UR: ABNORMAL
GLUCOSE UR STRIP.AUTO-MCNC: NORMAL MG/DL
KETONES UR STRIP.AUTO-MCNC: NEGATIVE MG/DL
LEUKOCYTE ESTERASE UR QL STRIP.AUTO: ABNORMAL
NITRITE UR QL STRIP.AUTO: NEGATIVE
PH UR STRIP.AUTO: 6.5 [PH]
PROT UR STRIP.AUTO-MCNC: NEGATIVE MG/DL
RBC # UR STRIP.AUTO: NEGATIVE /UL
RBC #/AREA URNS AUTO: ABNORMAL /HPF
SP GR UR STRIP.AUTO: 1
SQUAMOUS #/AREA URNS AUTO: ABNORMAL /HPF
UROBILINOGEN UR STRIP.AUTO-MCNC: NORMAL MG/DL
WBC #/AREA URNS AUTO: ABNORMAL /HPF
WBC CLUMPS #/AREA URNS AUTO: ABNORMAL /HPF

## 2024-09-22 PROCEDURE — 87086 URINE CULTURE/COLONY COUNT: CPT | Mod: STJLAB | Performed by: STUDENT IN AN ORGANIZED HEALTH CARE EDUCATION/TRAINING PROGRAM

## 2024-09-22 PROCEDURE — 4500999001 HC ED NO CHARGE

## 2024-09-22 PROCEDURE — 81003 URINALYSIS AUTO W/O SCOPE: CPT | Performed by: STUDENT IN AN ORGANIZED HEALTH CARE EDUCATION/TRAINING PROGRAM

## 2024-09-22 PROCEDURE — 99214 OFFICE O/P EST MOD 30 MIN: CPT

## 2024-09-22 RX ORDER — ONDANSETRON HYDROCHLORIDE 2 MG/ML
4 INJECTION, SOLUTION INTRAVENOUS EVERY 6 HOURS PRN
Status: DISCONTINUED | OUTPATIENT
Start: 2024-09-22 | End: 2024-09-23 | Stop reason: HOSPADM

## 2024-09-22 RX ORDER — LABETALOL HYDROCHLORIDE 5 MG/ML
20 INJECTION, SOLUTION INTRAVENOUS ONCE AS NEEDED
Status: DISCONTINUED | OUTPATIENT
Start: 2024-09-22 | End: 2024-09-23 | Stop reason: HOSPADM

## 2024-09-22 RX ORDER — HYDRALAZINE HYDROCHLORIDE 20 MG/ML
5 INJECTION INTRAMUSCULAR; INTRAVENOUS ONCE AS NEEDED
Status: DISCONTINUED | OUTPATIENT
Start: 2024-09-22 | End: 2024-09-23 | Stop reason: HOSPADM

## 2024-09-22 RX ORDER — NYSTATIN 100000 [USP'U]/G
1 POWDER TOPICAL 3 TIMES DAILY
Qty: 30 G | Refills: 0 | Status: SHIPPED | OUTPATIENT
Start: 2024-09-22

## 2024-09-22 RX ORDER — NIFEDIPINE 10 MG/1
10 CAPSULE ORAL ONCE AS NEEDED
Status: DISCONTINUED | OUTPATIENT
Start: 2024-09-22 | End: 2024-09-23 | Stop reason: HOSPADM

## 2024-09-22 RX ORDER — LIDOCAINE HYDROCHLORIDE 10 MG/ML
0.5 INJECTION, SOLUTION EPIDURAL; INFILTRATION; INTRACAUDAL; PERINEURAL ONCE AS NEEDED
Status: DISCONTINUED | OUTPATIENT
Start: 2024-09-22 | End: 2024-09-23 | Stop reason: HOSPADM

## 2024-09-22 RX ORDER — ONDANSETRON 4 MG/1
4 TABLET, FILM COATED ORAL EVERY 6 HOURS PRN
Status: DISCONTINUED | OUTPATIENT
Start: 2024-09-22 | End: 2024-09-23 | Stop reason: HOSPADM

## 2024-09-23 LAB — HOLD SPECIMEN: NORMAL
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
## 2024-09-24 LAB — BACTERIA UR CULT: NORMAL

## 2024-10-11 ASSESSMENT — ENCOUNTER SYMPTOMS
FEVER: 0
SWEATS: 1
COUGH: 1
HEMOPTYSIS: 0
MYALGIAS: 0
RHINORRHEA: 1
HEADACHES: 0
WHEEZING: 1
CHILLS: 1
SORE THROAT: 1
HEARTBURN: 0
WEIGHT LOSS: 0
SHORTNESS OF BREATH: 1

## 2024-10-12 ENCOUNTER — APPOINTMENT (OUTPATIENT)
Dept: PRIMARY CARE | Facility: CLINIC | Age: 34
End: 2024-10-12
Payer: COMMERCIAL

## 2024-10-18 ENCOUNTER — APPOINTMENT (OUTPATIENT)
Dept: OBSTETRICS AND GYNECOLOGY | Facility: CLINIC | Age: 34
End: 2024-10-18
Payer: COMMERCIAL

## 2024-10-30 ENCOUNTER — APPOINTMENT (OUTPATIENT)
Dept: OBSTETRICS AND GYNECOLOGY | Facility: CLINIC | Age: 34
End: 2024-10-30
Payer: COMMERCIAL

## 2024-11-15 ENCOUNTER — APPOINTMENT (OUTPATIENT)
Dept: OBSTETRICS AND GYNECOLOGY | Facility: CLINIC | Age: 34
End: 2024-11-15
Payer: COMMERCIAL

## 2024-11-29 ENCOUNTER — APPOINTMENT (OUTPATIENT)
Dept: OBSTETRICS AND GYNECOLOGY | Facility: CLINIC | Age: 34
End: 2024-11-29
Payer: COMMERCIAL

## 2024-12-06 ENCOUNTER — APPOINTMENT (OUTPATIENT)
Dept: OBSTETRICS AND GYNECOLOGY | Facility: CLINIC | Age: 34
End: 2024-12-06
Payer: COMMERCIAL

## 2024-12-13 ENCOUNTER — APPOINTMENT (OUTPATIENT)
Dept: OBSTETRICS AND GYNECOLOGY | Facility: CLINIC | Age: 34
End: 2024-12-13
Payer: COMMERCIAL

## 2024-12-18 ENCOUNTER — OFFICE VISIT (OUTPATIENT)
Dept: PRIMARY CARE CLINIC | Age: 34
End: 2024-12-18
Payer: COMMERCIAL

## 2024-12-18 VITALS
TEMPERATURE: 98.2 F | DIASTOLIC BLOOD PRESSURE: 70 MMHG | BODY MASS INDEX: 35.08 KG/M2 | HEART RATE: 91 BPM | RESPIRATION RATE: 18 BRPM | WEIGHT: 198 LBS | OXYGEN SATURATION: 97 % | HEIGHT: 63 IN | SYSTOLIC BLOOD PRESSURE: 110 MMHG

## 2024-12-18 DIAGNOSIS — I88.9 SUBMANDIBULAR LYMPHADENITIS: Primary | ICD-10-CM

## 2024-12-18 PROCEDURE — G8484 FLU IMMUNIZE NO ADMIN: HCPCS | Performed by: INTERNAL MEDICINE

## 2024-12-18 PROCEDURE — G8417 CALC BMI ABV UP PARAM F/U: HCPCS | Performed by: INTERNAL MEDICINE

## 2024-12-18 PROCEDURE — G8427 DOCREV CUR MEDS BY ELIG CLIN: HCPCS | Performed by: INTERNAL MEDICINE

## 2024-12-18 PROCEDURE — 99203 OFFICE O/P NEW LOW 30 MIN: CPT | Performed by: INTERNAL MEDICINE

## 2024-12-18 PROCEDURE — 4004F PT TOBACCO SCREEN RCVD TLK: CPT | Performed by: INTERNAL MEDICINE

## 2024-12-18 SDOH — ECONOMIC STABILITY: FOOD INSECURITY: WITHIN THE PAST 12 MONTHS, YOU WORRIED THAT YOUR FOOD WOULD RUN OUT BEFORE YOU GOT MONEY TO BUY MORE.: NEVER TRUE

## 2024-12-18 SDOH — ECONOMIC STABILITY: INCOME INSECURITY: HOW HARD IS IT FOR YOU TO PAY FOR THE VERY BASICS LIKE FOOD, HOUSING, MEDICAL CARE, AND HEATING?: NOT HARD AT ALL

## 2024-12-18 SDOH — ECONOMIC STABILITY: FOOD INSECURITY: WITHIN THE PAST 12 MONTHS, THE FOOD YOU BOUGHT JUST DIDN'T LAST AND YOU DIDN'T HAVE MONEY TO GET MORE.: NEVER TRUE

## 2024-12-18 ASSESSMENT — ENCOUNTER SYMPTOMS
COUGH: 0
SHORTNESS OF BREATH: 0
ABDOMINAL PAIN: 0
SORE THROAT: 0
VOMITING: 0
SINUS PAIN: 0
RHINORRHEA: 0
NAUSEA: 0
WHEEZING: 0
SINUS PRESSURE: 0
DIARRHEA: 0

## 2024-12-18 ASSESSMENT — PATIENT HEALTH QUESTIONNAIRE - PHQ9
SUM OF ALL RESPONSES TO PHQ QUESTIONS 1-9: 0
1. LITTLE INTEREST OR PLEASURE IN DOING THINGS: NOT AT ALL
2. FEELING DOWN, DEPRESSED OR HOPELESS: NOT AT ALL
SUM OF ALL RESPONSES TO PHQ QUESTIONS 1-9: 0
SUM OF ALL RESPONSES TO PHQ QUESTIONS 1-9: 0
SUM OF ALL RESPONSES TO PHQ9 QUESTIONS 1 & 2: 0
SUM OF ALL RESPONSES TO PHQ QUESTIONS 1-9: 0

## 2024-12-18 NOTE — PROGRESS NOTES
Subjective:      Patient ID: Deborah Aguilar is a 34 y.o. female    Ear pain x 1 day  HPI  Pt presents with 1 day of sudden onset sharp right ear pain rated 3/10, nonradiating. Pain is triggered by swallowing, not related to chewing. Pt is edentulous. No sore throat or difficultly swallowing. No fever or chills, no neck pain, sinus congestion, cough or headache.       Past Medical History:   Diagnosis Date    ADHD (attention deficit hyperactivity disorder)     Anxiety     HGSIL (high grade squamous intraepithelial lesion) on Pap smear of cervix 12/19/2017     Past Surgical History:   Procedure Laterality Date    LEEP N/A 12/20/2017    LE[place] performed by Arleth Lockhart [place] [facility] [place]     Social History     Socioeconomic History    Marital status: Single     Spouse name: Not on file    Number of children: Not on file    Years of education: Not on file    Highest education level: Not on file   Occupational History    Not on file   Tobacco Use    Smoking status: Every Day     Current packs/day: 0.50     Average packs/day: 0.5 packs/day for 17.0 years (8.5 ttl pk-yrs)     Types: Cigarettes    Smokeless tobacco: Never   Substance and Sexual Activity    Alcohol use: Not Currently     Alcohol/week: 0.0 standard drinks of alcohol     Comment: rare social    Drug use: Yes     Types: Marijuana (Weed)    Sexual activity: Yes     Partners: Male   Other Topics Concern    Not on file   Social History Narrative    Not on file     Social Determinants of Health     Financial Resource Strain: Low Risk  (12/18/2024)    Overall Financial Resource Strain (CARDIA)     Difficulty of Paying Living Expenses: Not hard at all   Food Insecurity: No Food Insecurity (12/18/2024)    Hunger Vital Sign     Worried About Running Out of Food in the Last Year: Never true     Ran Out of Food in the Last Year: Never true   Transportation Needs: Unknown (12/18/2024)    PRAPARE - Transportation     Lack of Transportation (Medical): Not on file     Lack of

## 2024-12-20 ENCOUNTER — APPOINTMENT (OUTPATIENT)
Dept: OBSTETRICS AND GYNECOLOGY | Facility: CLINIC | Age: 34
End: 2024-12-20
Payer: COMMERCIAL

## 2025-01-28 ENCOUNTER — APPOINTMENT (OUTPATIENT)
Dept: PRIMARY CARE | Facility: CLINIC | Age: 35
End: 2025-01-28
Payer: COMMERCIAL

## 2025-01-28 VITALS
HEIGHT: 63 IN | RESPIRATION RATE: 19 BRPM | SYSTOLIC BLOOD PRESSURE: 106 MMHG | BODY MASS INDEX: 32.6 KG/M2 | HEART RATE: 78 BPM | WEIGHT: 184 LBS | TEMPERATURE: 94.8 F | OXYGEN SATURATION: 98 % | DIASTOLIC BLOOD PRESSURE: 88 MMHG

## 2025-01-28 DIAGNOSIS — Z79.899 MEDICATION MANAGEMENT: ICD-10-CM

## 2025-01-28 DIAGNOSIS — F60.3 BORDERLINE PERSONALITY DISORDER (MULTI): ICD-10-CM

## 2025-01-28 DIAGNOSIS — F90.2 ATTENTION DEFICIT HYPERACTIVITY DISORDER (ADHD), COMBINED TYPE: Primary | ICD-10-CM

## 2025-01-28 PROCEDURE — 99213 OFFICE O/P EST LOW 20 MIN: CPT | Performed by: FAMILY MEDICINE

## 2025-01-28 RX ORDER — DEXTROAMPHETAMINE SACCHARATE, AMPHETAMINE ASPARTATE, DEXTROAMPHETAMINE SULFATE AND AMPHETAMINE SULFATE 5; 5; 5; 5 MG/1; MG/1; MG/1; MG/1
20 TABLET ORAL 2 TIMES DAILY
Qty: 60 TABLET | Refills: 0 | Status: SHIPPED | OUTPATIENT
Start: 2025-03-29

## 2025-01-28 RX ORDER — DEXTROAMPHETAMINE SACCHARATE, AMPHETAMINE ASPARTATE, DEXTROAMPHETAMINE SULFATE AND AMPHETAMINE SULFATE 5; 5; 5; 5 MG/1; MG/1; MG/1; MG/1
20 TABLET ORAL 2 TIMES DAILY
Qty: 60 TABLET | Refills: 0 | Status: SHIPPED | OUTPATIENT
Start: 2025-02-27 | End: 2025-03-29

## 2025-01-28 RX ORDER — DEXTROAMPHETAMINE SACCHARATE, AMPHETAMINE ASPARTATE, DEXTROAMPHETAMINE SULFATE AND AMPHETAMINE SULFATE 5; 5; 5; 5 MG/1; MG/1; MG/1; MG/1
20 TABLET ORAL 2 TIMES DAILY
Qty: 60 TABLET | Refills: 0 | Status: SHIPPED | OUTPATIENT
Start: 2025-01-28 | End: 2025-02-27

## 2025-01-28 ASSESSMENT — PROMIS GLOBAL HEALTH SCALE
RATE_AVERAGE_FATIGUE: MODERATE
RATE_GENERAL_HEALTH: GOOD
RATE_MENTAL_HEALTH: FAIR
RATE_SOCIAL_SATISFACTION: VERY GOOD
CARRYOUT_PHYSICAL_ACTIVITIES: MODERATELY
EMOTIONAL_PROBLEMS: OFTEN
RATE_QUALITY_OF_LIFE: VERY GOOD
RATE_AVERAGE_PAIN: 0
RATE_PHYSICAL_HEALTH: GOOD
CARRYOUT_SOCIAL_ACTIVITIES: GOOD

## 2025-01-28 ASSESSMENT — ENCOUNTER SYMPTOMS
ACTIVITY CHANGE: 0
ABDOMINAL PAIN: 0
RECTAL PAIN: 0
EYE PAIN: 0
PHOTOPHOBIA: 0
SLEEP DISTURBANCE: 0
FATIGUE: 0
COUGH: 0
CONFUSION: 0
FEVER: 0
ARTHRALGIAS: 0
SPEECH DIFFICULTY: 0
ABDOMINAL DISTENTION: 0
FLANK PAIN: 0
NECK STIFFNESS: 0
DEPRESSION: 0
APPETITE CHANGE: 0
PALPITATIONS: 0
ADENOPATHY: 0
SINUS PRESSURE: 0
POLYPHAGIA: 0
DIZZINESS: 0
SINUS PAIN: 0
OCCASIONAL FEELINGS OF UNSTEADINESS: 0
BLOOD IN STOOL: 0
RHINORRHEA: 0
SHORTNESS OF BREATH: 0
SORE THROAT: 0
DYSPHORIC MOOD: 0
DIARRHEA: 0
DYSURIA: 0
COLOR CHANGE: 0
STRIDOR: 0
HEMATURIA: 0
CONSTIPATION: 0
LOSS OF SENSATION IN FEET: 0
TROUBLE SWALLOWING: 0
CHEST TIGHTNESS: 0
HEADACHES: 0
POLYDIPSIA: 0
SEIZURES: 0
MYALGIAS: 0
CONSTITUTIONAL NEGATIVE: 1

## 2025-01-28 ASSESSMENT — PATIENT HEALTH QUESTIONNAIRE - PHQ9
2. FEELING DOWN, DEPRESSED OR HOPELESS: NOT AT ALL
SUM OF ALL RESPONSES TO PHQ9 QUESTIONS 1 AND 2: 0
1. LITTLE INTEREST OR PLEASURE IN DOING THINGS: NOT AT ALL

## 2025-01-28 NOTE — PROGRESS NOTES
Subjective   Patient ID: Helene Harris is a 34 y.o. female who presents for ADHD.    Patient would like discuss got back on adderall after have a baby.    Patient denies any symptoms or concerns today.    ADHD  This is a recurrent problem. The current episode started more than 1 year ago. The problem occurs constantly. The problem has been unchanged. Pertinent negatives include no abdominal pain, arthralgias, chest pain, congestion, coughing, fatigue, fever, headaches, myalgias, rash or sore throat. Nothing aggravates the symptoms. She has tried oral narcotics for the symptoms. The treatment provided mild relief.        Review of Systems   Constitutional: Negative.  Negative for activity change, appetite change, fatigue and fever.   HENT:  Negative for congestion, dental problem, ear discharge, ear pain, mouth sores, rhinorrhea, sinus pressure, sinus pain, sore throat, tinnitus and trouble swallowing.    Eyes:  Negative for photophobia, pain and visual disturbance.   Respiratory:  Negative for cough, chest tightness, shortness of breath and stridor.    Cardiovascular:  Negative for chest pain and palpitations.   Gastrointestinal:  Negative for abdominal distention, abdominal pain, blood in stool, constipation, diarrhea and rectal pain.   Endocrine: Negative for cold intolerance, heat intolerance, polydipsia, polyphagia and polyuria.   Genitourinary:  Negative for dysuria, flank pain, hematuria and urgency.   Musculoskeletal:  Negative for arthralgias, gait problem, myalgias and neck stiffness.   Skin:  Negative for color change and rash.   Allergic/Immunologic: Negative for environmental allergies and food allergies.   Neurological:  Negative for dizziness, seizures, syncope, speech difficulty and headaches.   Hematological:  Negative for adenopathy.   Psychiatric/Behavioral:  Positive for agitation and decreased concentration. Negative for confusion, dysphoric mood and sleep disturbance. The patient is  "nervous/anxious.        Objective   /88 (BP Location: Right arm, Patient Position: Sitting, BP Cuff Size: Adult)   Pulse 78   Temp 34.9 °C (94.8 °F) (Skin)   Resp 19   Ht 1.6 m (5' 3\")   Wt 83.5 kg (184 lb)   LMP 03/19/2024   SpO2 98%   Breastfeeding Unknown   BMI 32.59 kg/m²     Physical Exam  Vitals reviewed.   Constitutional:       General: She is not in acute distress.     Appearance: She is obese. She is not ill-appearing or diaphoretic.   HENT:      Head: Normocephalic.      Right Ear: Tympanic membrane and external ear normal.      Left Ear: Tympanic membrane and external ear normal.      Nose: Nose normal. No congestion.      Mouth/Throat:      Pharynx: No posterior oropharyngeal erythema.   Eyes:      General:         Right eye: No discharge.         Left eye: No discharge.      Extraocular Movements: Extraocular movements intact.      Conjunctiva/sclera: Conjunctivae normal.      Pupils: Pupils are equal, round, and reactive to light.   Cardiovascular:      Rate and Rhythm: Normal rate and regular rhythm.      Pulses: Normal pulses.      Heart sounds: Normal heart sounds. No murmur heard.  Pulmonary:      Effort: Pulmonary effort is normal. No respiratory distress.      Breath sounds: Normal breath sounds. No wheezing or rales.   Chest:      Chest wall: No tenderness.   Abdominal:      General: Bowel sounds are normal. There is distension.      Palpations: There is no mass.      Tenderness: There is no abdominal tenderness. There is no guarding.   Musculoskeletal:         General: No tenderness. Normal range of motion.      Cervical back: Normal range of motion and neck supple. No tenderness.      Right lower leg: No edema.      Left lower leg: No edema.   Skin:     General: Skin is dry.      Coloration: Skin is not jaundiced.      Findings: No bruising, erythema or rash.   Neurological:      General: No focal deficit present.      Mental Status: She is alert and oriented to person, place, " and time. Mental status is at baseline.      Cranial Nerves: No cranial nerve deficit.      Sensory: No sensory deficit.      Coordination: Coordination normal.      Gait: Gait normal.   Psychiatric:         Thought Content: Thought content normal.         Judgment: Judgment normal.      Comments: Anxious         Assessment/Plan   Problem List Items Addressed This Visit             ICD-10-CM    Attention deficit hyperactivity disorder (ADHD) - Primary F90.9    Borderline personality disorder (Multi) F60.3     Monitored/stable          Other Visit Diagnoses         Codes    Medication management     Z79.899    Relevant Medications    amphetamine-dextroamphetamine (Adderall) 20 mg tablet    amphetamine-dextroamphetamine (Adderall) 20 mg tablet (Start on 2/27/2025)    amphetamine-dextroamphetamine (Adderall) 20 mg tablet (Start on 3/29/2025)    Other Relevant Orders    Opiate/Opioid/Benzo Prescription Compliance (Completed)    Amphetamine Confirm, Urine (Completed)          Scribe Attestation  By signing my name below, I, Linda Navas MA , Scribe   attest that this documentation has been prepared under the direction and in the presence of Chris Davey DO.    Provider Attestation - Scribe documentation    All medical record entries made by the Scribe were at my direction and personally dictated by me. I have reviewed the chart and agree that the record accurately reflects my personal performance of the history, physical exam, discussion and plan.

## 2025-01-28 NOTE — PATIENT INSTRUCTIONS
Follow up in 3 months    Continue current medications and therapy for chronic medical conditions.    Patient was advised importance of proper diet/nutrition in addition adequate hydration. Patient was encouraged moderate exercise program to include 30 minutes daily for 5 days of the week or 150 minutes weekly. Patient will follow-up with us as scheduled.    I personally reviewed the OARRS report for this patient. I have considered the risks of abuse, dependence, addiction, and diversion.    UDS/CSA: 01/28/2025    Start Adderall 20 mg twice daily

## 2025-02-01 ENCOUNTER — TELEPHONE (OUTPATIENT)
Dept: PRIMARY CARE | Facility: CLINIC | Age: 35
End: 2025-02-01
Payer: COMMERCIAL

## 2025-02-01 NOTE — TELEPHONE ENCOUNTER
----- Message from Chris Davey sent at 1/31/2025  4:11 PM EST -----  UDS positive for THC.  Please obtain alternative UDS.  Thank you  ----- Message -----  From: Neil Downey Results In  Sent: 1/30/2025   9:38 AM EST  To: Chris Davey, DO

## 2025-02-02 LAB
1OH-MIDAZOLAM UR-MCNC: NEGATIVE NG/ML
7AMINOCLONAZEPAM UR-MCNC: NEGATIVE NG/ML
A-OH ALPRAZ UR-MCNC: NEGATIVE NG/ML
A-OH-TRIAZOLAM UR-MCNC: NEGATIVE NG/ML
AMPHET UR-MCNC: NEGATIVE NG/ML
AMPHETAMINES UR QL: NEGATIVE NG/ML
BARBITURATES UR QL: NEGATIVE NG/ML
BZE UR QL: NEGATIVE NG/ML
CODEINE UR-MCNC: NEGATIVE NG/ML
CREAT UR-MCNC: 113.3 MG/DL
DRUG SCREEN COMMENT UR-IMP: ABNORMAL
EDDP UR-MCNC: NEGATIVE NG/ML
FENTANYL UR-MCNC: NEGATIVE NG/ML
HYDROCODONE UR-MCNC: NEGATIVE NG/ML
HYDROMORPHONE UR-MCNC: NEGATIVE NG/ML
LORAZEPAM UR-MCNC: NEGATIVE NG/ML
MDA UR-MCNC: NEGATIVE NG/ML
MDEA UR-MCNC: NEGATIVE NG/ML
MDMA UR-MCNC: NEGATIVE NG/ML
METHADONE UR-MCNC: NEGATIVE NG/ML
METHAMPHET UR-MCNC: NEGATIVE NG/ML
MORPHINE UR-MCNC: NEGATIVE NG/ML
NORDIAZEPAM UR-MCNC: NEGATIVE NG/ML
NORFENTANYL UR-MCNC: NEGATIVE NG/ML
NORHYDROCODONE UR CFM-MCNC: NEGATIVE NG/ML
NOROXYCODONE UR CFM-MCNC: 119 NG/ML
NORTRAMADOL UR-MCNC: NEGATIVE NG/ML
OH-ETHYLFLURAZ UR-MCNC: NEGATIVE NG/ML
OXAZEPAM UR-MCNC: NEGATIVE NG/ML
OXIDANTS UR QL: NEGATIVE MCG/ML
OXYCODONE UR CFM-MCNC: 190 NG/ML
OXYMORPHONE UR CFM-MCNC: NEGATIVE NG/ML
PCP UR QL: NEGATIVE NG/ML
PH UR: 5.6 [PH] (ref 4.5–9)
PHENTERMINE UR-MCNC: NEGATIVE NG/ML
QUEST 6 ACETYLMORPHINE: NEGATIVE NG/ML
QUEST NOTES AND COMMENTS: ABNORMAL
QUEST ZOLPIDEM: NEGATIVE NG/ML
TEMAZEPAM UR-MCNC: NEGATIVE NG/ML
THC UR QL: POSITIVE NG/ML
THC UR-MCNC: 405 NG/ML
TRAMADOL UR-MCNC: NEGATIVE NG/ML
ZOLPIDEM PHENYL-4-CARB UR CFM-MCNC: NEGATIVE NG/ML

## 2025-02-04 PROBLEM — F60.3 BORDERLINE PERSONALITY DISORDER (MULTI): Status: ACTIVE | Noted: 2025-02-04

## 2025-02-04 ASSESSMENT — ENCOUNTER SYMPTOMS
AGITATION: 1
NERVOUS/ANXIOUS: 1
DECREASED CONCENTRATION: 1

## 2025-02-26 DIAGNOSIS — Z79.899 MEDICATION MANAGEMENT: ICD-10-CM

## 2025-02-28 RX ORDER — DEXTROAMPHETAMINE SACCHARATE, AMPHETAMINE ASPARTATE, DEXTROAMPHETAMINE SULFATE AND AMPHETAMINE SULFATE 5; 5; 5; 5 MG/1; MG/1; MG/1; MG/1
20 TABLET ORAL 2 TIMES DAILY
Qty: 60 TABLET | Refills: 0 | OUTPATIENT
Start: 2025-03-29

## 2025-03-18 ENCOUNTER — HOSPITAL ENCOUNTER (EMERGENCY)
Facility: HOSPITAL | Age: 35
Discharge: HOME | End: 2025-03-18
Attending: STUDENT IN AN ORGANIZED HEALTH CARE EDUCATION/TRAINING PROGRAM
Payer: COMMERCIAL

## 2025-03-18 ENCOUNTER — APPOINTMENT (OUTPATIENT)
Dept: RADIOLOGY | Facility: HOSPITAL | Age: 35
End: 2025-03-18
Payer: COMMERCIAL

## 2025-03-18 VITALS
BODY MASS INDEX: 32.6 KG/M2 | HEIGHT: 63 IN | RESPIRATION RATE: 18 BRPM | HEART RATE: 76 BPM | WEIGHT: 184 LBS | DIASTOLIC BLOOD PRESSURE: 75 MMHG | SYSTOLIC BLOOD PRESSURE: 115 MMHG | TEMPERATURE: 97.2 F | OXYGEN SATURATION: 98 %

## 2025-03-18 DIAGNOSIS — S63.502A WRIST SPRAIN, LEFT, INITIAL ENCOUNTER: Primary | ICD-10-CM

## 2025-03-18 DIAGNOSIS — S13.9XXA NECK SPRAIN, INITIAL ENCOUNTER: ICD-10-CM

## 2025-03-18 PROCEDURE — 70450 CT HEAD/BRAIN W/O DYE: CPT | Performed by: STUDENT IN AN ORGANIZED HEALTH CARE EDUCATION/TRAINING PROGRAM

## 2025-03-18 PROCEDURE — 99284 EMERGENCY DEPT VISIT MOD MDM: CPT | Mod: 25 | Performed by: STUDENT IN AN ORGANIZED HEALTH CARE EDUCATION/TRAINING PROGRAM

## 2025-03-18 PROCEDURE — 73090 X-RAY EXAM OF FOREARM: CPT | Mod: LEFT SIDE | Performed by: STUDENT IN AN ORGANIZED HEALTH CARE EDUCATION/TRAINING PROGRAM

## 2025-03-18 PROCEDURE — 99284 EMERGENCY DEPT VISIT MOD MDM: CPT | Performed by: STUDENT IN AN ORGANIZED HEALTH CARE EDUCATION/TRAINING PROGRAM

## 2025-03-18 PROCEDURE — 73090 X-RAY EXAM OF FOREARM: CPT | Mod: LT

## 2025-03-18 PROCEDURE — 70450 CT HEAD/BRAIN W/O DYE: CPT

## 2025-03-18 PROCEDURE — 73110 X-RAY EXAM OF WRIST: CPT | Mod: LT

## 2025-03-18 PROCEDURE — 2500000004 HC RX 250 GENERAL PHARMACY W/ HCPCS (ALT 636 FOR OP/ED): Performed by: STUDENT IN AN ORGANIZED HEALTH CARE EDUCATION/TRAINING PROGRAM

## 2025-03-18 PROCEDURE — 72125 CT NECK SPINE W/O DYE: CPT

## 2025-03-18 PROCEDURE — 96372 THER/PROPH/DIAG INJ SC/IM: CPT | Performed by: STUDENT IN AN ORGANIZED HEALTH CARE EDUCATION/TRAINING PROGRAM

## 2025-03-18 PROCEDURE — 72125 CT NECK SPINE W/O DYE: CPT | Performed by: STUDENT IN AN ORGANIZED HEALTH CARE EDUCATION/TRAINING PROGRAM

## 2025-03-18 PROCEDURE — 2500000001 HC RX 250 WO HCPCS SELF ADMINISTERED DRUGS (ALT 637 FOR MEDICARE OP): Performed by: STUDENT IN AN ORGANIZED HEALTH CARE EDUCATION/TRAINING PROGRAM

## 2025-03-18 PROCEDURE — 73110 X-RAY EXAM OF WRIST: CPT | Mod: LEFT SIDE | Performed by: STUDENT IN AN ORGANIZED HEALTH CARE EDUCATION/TRAINING PROGRAM

## 2025-03-18 RX ORDER — ACETAMINOPHEN 325 MG/1
975 TABLET ORAL ONCE
Status: COMPLETED | OUTPATIENT
Start: 2025-03-18 | End: 2025-03-18

## 2025-03-18 RX ORDER — KETOROLAC TROMETHAMINE 30 MG/ML
30 INJECTION, SOLUTION INTRAMUSCULAR; INTRAVENOUS ONCE
Status: COMPLETED | OUTPATIENT
Start: 2025-03-18 | End: 2025-03-18

## 2025-03-18 RX ORDER — LIDOCAINE 50 MG/G
1 PATCH TOPICAL DAILY
Qty: 5 PATCH | Refills: 0 | Status: SHIPPED | OUTPATIENT
Start: 2025-03-18

## 2025-03-18 RX ORDER — LIDOCAINE 560 MG/1
1 PATCH PERCUTANEOUS; TOPICAL; TRANSDERMAL DAILY
Status: DISCONTINUED | OUTPATIENT
Start: 2025-03-18 | End: 2025-03-18 | Stop reason: HOSPADM

## 2025-03-18 RX ADMIN — KETOROLAC TROMETHAMINE 30 MG: 30 INJECTION, SOLUTION INTRAMUSCULAR at 02:46

## 2025-03-18 RX ADMIN — ACETAMINOPHEN 975 MG: 325 TABLET ORAL at 02:46

## 2025-03-18 ASSESSMENT — PAIN SCALES - GENERAL: PAINLEVEL_OUTOF10: 6

## 2025-03-18 ASSESSMENT — LIFESTYLE VARIABLES
HAVE PEOPLE ANNOYED YOU BY CRITICIZING YOUR DRINKING: NO
TOTAL SCORE: 0
HAVE YOU EVER FELT YOU SHOULD CUT DOWN ON YOUR DRINKING: NO
EVER HAD A DRINK FIRST THING IN THE MORNING TO STEADY YOUR NERVES TO GET RID OF A HANGOVER: NO
EVER FELT BAD OR GUILTY ABOUT YOUR DRINKING: NO

## 2025-03-18 ASSESSMENT — PAIN DESCRIPTION - PAIN TYPE: TYPE: ACUTE PAIN

## 2025-03-18 ASSESSMENT — PAIN DESCRIPTION - DESCRIPTORS: DESCRIPTORS: SHARP

## 2025-03-18 ASSESSMENT — PAIN DESCRIPTION - LOCATION: LOCATION: ARM

## 2025-03-18 ASSESSMENT — PAIN - FUNCTIONAL ASSESSMENT: PAIN_FUNCTIONAL_ASSESSMENT: 0-10

## 2025-03-18 ASSESSMENT — PAIN DESCRIPTION - ORIENTATION: ORIENTATION: LEFT
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
## 2025-03-18 NOTE — Clinical Note
Helene Harris was seen and treated in our emergency department on 3/18/2025.  She may return to work on 03/20/2025.       If you have any questions or concerns, please don't hesitate to call.      Jens Benz MD

## 2025-03-26 ENCOUNTER — APPOINTMENT (OUTPATIENT)
Dept: RADIOLOGY | Facility: HOSPITAL | Age: 35
End: 2025-03-26
Payer: COMMERCIAL

## 2025-03-26 ENCOUNTER — HOSPITAL ENCOUNTER (EMERGENCY)
Facility: HOSPITAL | Age: 35
Discharge: HOME | End: 2025-03-27
Payer: COMMERCIAL

## 2025-03-26 DIAGNOSIS — N39.0 URINARY TRACT INFECTION WITHOUT HEMATURIA, SITE UNSPECIFIED: Primary | ICD-10-CM

## 2025-03-26 LAB
ALBUMIN SERPL BCP-MCNC: 4 G/DL (ref 3.4–5)
ALP SERPL-CCNC: 65 U/L (ref 33–110)
ALT SERPL W P-5'-P-CCNC: 14 U/L (ref 7–45)
ANION GAP SERPL CALC-SCNC: 15 MMOL/L (ref 10–20)
APPEARANCE UR: CLEAR
AST SERPL W P-5'-P-CCNC: 12 U/L (ref 9–39)
BACTERIA #/AREA URNS AUTO: ABNORMAL /HPF
BASOPHILS # BLD AUTO: 0.07 X10*3/UL (ref 0–0.1)
BASOPHILS NFR BLD AUTO: 0.4 %
BILIRUB SERPL-MCNC: 0.7 MG/DL (ref 0–1.2)
BILIRUB UR STRIP.AUTO-MCNC: NEGATIVE MG/DL
BUN SERPL-MCNC: 12 MG/DL (ref 6–23)
CALCIUM SERPL-MCNC: 9.1 MG/DL (ref 8.6–10.3)
CHLORIDE SERPL-SCNC: 96 MMOL/L (ref 98–107)
CO2 SERPL-SCNC: 23 MMOL/L (ref 21–32)
COLOR UR: YELLOW
CREAT SERPL-MCNC: 1.06 MG/DL (ref 0.5–1.05)
EGFRCR SERPLBLD CKD-EPI 2021: 70 ML/MIN/1.73M*2
EOSINOPHIL # BLD AUTO: 0.03 X10*3/UL (ref 0–0.7)
EOSINOPHIL NFR BLD AUTO: 0.2 %
ERYTHROCYTE [DISTWIDTH] IN BLOOD BY AUTOMATED COUNT: 13.8 % (ref 11.5–14.5)
FLUAV RNA RESP QL NAA+PROBE: NOT DETECTED
FLUBV RNA RESP QL NAA+PROBE: NOT DETECTED
GLUCOSE SERPL-MCNC: 106 MG/DL (ref 74–99)
GLUCOSE UR STRIP.AUTO-MCNC: NORMAL MG/DL
HCT VFR BLD AUTO: 42.3 % (ref 36–46)
HGB BLD-MCNC: 14.6 G/DL (ref 12–16)
IMM GRANULOCYTES # BLD AUTO: 0.07 X10*3/UL (ref 0–0.7)
IMM GRANULOCYTES NFR BLD AUTO: 0.4 % (ref 0–0.9)
KETONES UR STRIP.AUTO-MCNC: ABNORMAL MG/DL
LACTATE SERPL-SCNC: 0.6 MMOL/L (ref 0.4–2)
LEUKOCYTE ESTERASE UR QL STRIP.AUTO: ABNORMAL
LIPASE SERPL-CCNC: 29 U/L (ref 9–82)
LYMPHOCYTES # BLD AUTO: 2.46 X10*3/UL (ref 1.2–4.8)
LYMPHOCYTES NFR BLD AUTO: 15.1 %
MCH RBC QN AUTO: 31 PG (ref 26–34)
MCHC RBC AUTO-ENTMCNC: 34.5 G/DL (ref 32–36)
MCV RBC AUTO: 90 FL (ref 80–100)
MONOCYTES # BLD AUTO: 1.54 X10*3/UL (ref 0.1–1)
MONOCYTES NFR BLD AUTO: 9.5 %
NEUTROPHILS # BLD AUTO: 12.1 X10*3/UL (ref 1.2–7.7)
NEUTROPHILS NFR BLD AUTO: 74.4 %
NITRITE UR QL STRIP.AUTO: NEGATIVE
NRBC BLD-RTO: 0 /100 WBCS (ref 0–0)
PH UR STRIP.AUTO: 6 [PH]
PLATELET # BLD AUTO: 303 X10*3/UL (ref 150–450)
POTASSIUM SERPL-SCNC: 3.7 MMOL/L (ref 3.5–5.3)
PROT SERPL-MCNC: 7.8 G/DL (ref 6.4–8.2)
PROT UR STRIP.AUTO-MCNC: ABNORMAL MG/DL
RBC # BLD AUTO: 4.71 X10*6/UL (ref 4–5.2)
RBC # UR STRIP.AUTO: ABNORMAL MG/DL
RBC #/AREA URNS AUTO: ABNORMAL /HPF
SARS-COV-2 RNA RESP QL NAA+PROBE: NOT DETECTED
SODIUM SERPL-SCNC: 130 MMOL/L (ref 136–145)
SP GR UR STRIP.AUTO: 1.03
SQUAMOUS #/AREA URNS AUTO: ABNORMAL /HPF
UROBILINOGEN UR STRIP.AUTO-MCNC: ABNORMAL MG/DL
WBC # BLD AUTO: 16.3 X10*3/UL (ref 4.4–11.3)
WBC #/AREA URNS AUTO: ABNORMAL /HPF

## 2025-03-26 PROCEDURE — 74177 CT ABD & PELVIS W/CONTRAST: CPT | Performed by: RADIOLOGY

## 2025-03-26 PROCEDURE — 83605 ASSAY OF LACTIC ACID: CPT | Performed by: PHYSICIAN ASSISTANT

## 2025-03-26 PROCEDURE — 87086 URINE CULTURE/COLONY COUNT: CPT | Mod: ELYLAB | Performed by: PHYSICIAN ASSISTANT

## 2025-03-26 PROCEDURE — 80053 COMPREHEN METABOLIC PANEL: CPT | Performed by: PHYSICIAN ASSISTANT

## 2025-03-26 PROCEDURE — 2550000001 HC RX 255 CONTRASTS: Performed by: PHYSICIAN ASSISTANT

## 2025-03-26 PROCEDURE — 99285 EMERGENCY DEPT VISIT HI MDM: CPT | Mod: 25

## 2025-03-26 PROCEDURE — 87636 SARSCOV2 & INF A&B AMP PRB: CPT | Performed by: PHYSICIAN ASSISTANT

## 2025-03-26 PROCEDURE — 74177 CT ABD & PELVIS W/CONTRAST: CPT

## 2025-03-26 PROCEDURE — 36415 COLL VENOUS BLD VENIPUNCTURE: CPT | Performed by: PHYSICIAN ASSISTANT

## 2025-03-26 PROCEDURE — 2500000004 HC RX 250 GENERAL PHARMACY W/ HCPCS (ALT 636 FOR OP/ED): Performed by: PHYSICIAN ASSISTANT

## 2025-03-26 PROCEDURE — 96375 TX/PRO/DX INJ NEW DRUG ADDON: CPT

## 2025-03-26 PROCEDURE — 96365 THER/PROPH/DIAG IV INF INIT: CPT | Mod: 59

## 2025-03-26 PROCEDURE — 96366 THER/PROPH/DIAG IV INF ADDON: CPT

## 2025-03-26 PROCEDURE — 81001 URINALYSIS AUTO W/SCOPE: CPT | Performed by: PHYSICIAN ASSISTANT

## 2025-03-26 PROCEDURE — 96361 HYDRATE IV INFUSION ADD-ON: CPT

## 2025-03-26 PROCEDURE — 85025 COMPLETE CBC W/AUTO DIFF WBC: CPT | Performed by: PHYSICIAN ASSISTANT

## 2025-03-26 PROCEDURE — 83690 ASSAY OF LIPASE: CPT | Performed by: PHYSICIAN ASSISTANT

## 2025-03-26 RX ORDER — ONDANSETRON HYDROCHLORIDE 2 MG/ML
4 INJECTION, SOLUTION INTRAVENOUS ONCE
Status: COMPLETED | OUTPATIENT
Start: 2025-03-26 | End: 2025-03-26

## 2025-03-26 RX ORDER — CEFTRIAXONE 1 G/50ML
1 INJECTION, SOLUTION INTRAVENOUS ONCE
Status: COMPLETED | OUTPATIENT
Start: 2025-03-26 | End: 2025-03-27

## 2025-03-26 RX ORDER — KETOROLAC TROMETHAMINE 30 MG/ML
30 INJECTION, SOLUTION INTRAMUSCULAR; INTRAVENOUS ONCE
Status: COMPLETED | OUTPATIENT
Start: 2025-03-26 | End: 2025-03-26

## 2025-03-26 RX ORDER — FENTANYL CITRATE 50 UG/ML
50 INJECTION, SOLUTION INTRAMUSCULAR; INTRAVENOUS ONCE
Status: COMPLETED | OUTPATIENT
Start: 2025-03-26 | End: 2025-03-26

## 2025-03-26 RX ADMIN — ONDANSETRON 4 MG: 2 INJECTION INTRAMUSCULAR; INTRAVENOUS at 20:41

## 2025-03-26 RX ADMIN — FENTANYL CITRATE 50 MCG: 50 INJECTION, SOLUTION INTRAMUSCULAR; INTRAVENOUS at 21:07

## 2025-03-26 RX ADMIN — SODIUM CHLORIDE 1000 ML: 9 INJECTION, SOLUTION INTRAVENOUS at 20:42

## 2025-03-26 RX ADMIN — CEFTRIAXONE SODIUM 1 G: 1 INJECTION, SOLUTION INTRAVENOUS at 23:38

## 2025-03-26 RX ADMIN — IOHEXOL 75 ML: 350 INJECTION, SOLUTION INTRAVENOUS at 21:46

## 2025-03-26 RX ADMIN — KETOROLAC TROMETHAMINE 30 MG: 30 INJECTION, SOLUTION INTRAMUSCULAR at 23:38

## 2025-03-26 RX ADMIN — SODIUM CHLORIDE 1000 ML: 9 INJECTION, SOLUTION INTRAVENOUS at 22:40

## 2025-03-26 ASSESSMENT — PAIN DESCRIPTION - PAIN TYPE: TYPE: ACUTE PAIN

## 2025-03-26 ASSESSMENT — PAIN - FUNCTIONAL ASSESSMENT
PAIN_FUNCTIONAL_ASSESSMENT: 0-10
PAIN_FUNCTIONAL_ASSESSMENT: 0-10

## 2025-03-26 ASSESSMENT — PAIN DESCRIPTION - LOCATION
LOCATION_3: ABDOMEN
LOCATION: BACK
LOCATION_2: HEAD
LOCATION: BACK

## 2025-03-26 ASSESSMENT — LIFESTYLE VARIABLES
HAVE PEOPLE ANNOYED YOU BY CRITICIZING YOUR DRINKING: NO
EVER FELT BAD OR GUILTY ABOUT YOUR DRINKING: NO
HAVE YOU EVER FELT YOU SHOULD CUT DOWN ON YOUR DRINKING: NO
TOTAL SCORE: 0
EVER HAD A DRINK FIRST THING IN THE MORNING TO STEADY YOUR NERVES TO GET RID OF A HANGOVER: NO

## 2025-03-26 ASSESSMENT — COLUMBIA-SUICIDE SEVERITY RATING SCALE - C-SSRS
6. HAVE YOU EVER DONE ANYTHING, STARTED TO DO ANYTHING, OR PREPARED TO DO ANYTHING TO END YOUR LIFE?: NO
1. IN THE PAST MONTH, HAVE YOU WISHED YOU WERE DEAD OR WISHED YOU COULD GO TO SLEEP AND NOT WAKE UP?: NO
2. HAVE YOU ACTUALLY HAD ANY THOUGHTS OF KILLING YOURSELF?: NO

## 2025-03-26 ASSESSMENT — PAIN SCALES - GENERAL
PAINLEVEL_OUTOF10: 9
PAINLEVEL_OUTOF10: 6
PAINLEVEL_OUTOF10: 9

## 2025-03-26 ASSESSMENT — PAIN DESCRIPTION - FREQUENCY: FREQUENCY: CONSTANT/CONTINUOUS

## 2025-03-26 ASSESSMENT — PAIN DESCRIPTION - ORIENTATION: ORIENTATION: RIGHT;LEFT

## 2025-03-27 ENCOUNTER — APPOINTMENT (OUTPATIENT)
Dept: PRIMARY CARE | Facility: CLINIC | Age: 35
End: 2025-03-27
Payer: COMMERCIAL

## 2025-03-27 VITALS
RESPIRATION RATE: 16 BRPM | TEMPERATURE: 97.7 F | BODY MASS INDEX: 32.78 KG/M2 | HEART RATE: 87 BPM | OXYGEN SATURATION: 99 % | DIASTOLIC BLOOD PRESSURE: 69 MMHG | SYSTOLIC BLOOD PRESSURE: 109 MMHG | HEIGHT: 63 IN | WEIGHT: 185 LBS

## 2025-03-27 DIAGNOSIS — F90.9 ATTENTION DEFICIT HYPERACTIVITY DISORDER (ADHD), UNSPECIFIED ADHD TYPE: ICD-10-CM

## 2025-03-27 LAB
BACTERIA UR CULT: NO GROWTH
HOLD SPECIMEN: NORMAL

## 2025-03-27 RX ORDER — CEPHALEXIN 500 MG/1
500 CAPSULE ORAL EVERY 6 HOURS
Qty: 40 CAPSULE | Refills: 0 | Status: SHIPPED | OUTPATIENT
Start: 2025-03-27 | End: 2025-04-06

## 2025-03-27 RX ORDER — ONDANSETRON 4 MG/1
4 TABLET, ORALLY DISINTEGRATING ORAL EVERY 8 HOURS PRN
Qty: 9 TABLET | Refills: 0 | Status: SHIPPED | OUTPATIENT
Start: 2025-03-27 | End: 2025-03-31

## 2025-03-27 ASSESSMENT — PAIN - FUNCTIONAL ASSESSMENT: PAIN_FUNCTIONAL_ASSESSMENT: 0-10

## 2025-03-27 ASSESSMENT — PAIN SCALES - GENERAL: PAINLEVEL_OUTOF10: 0 - NO PAIN

## 2025-03-27 NOTE — ED PROVIDER NOTES
HPI   Chief Complaint   Patient presents with    Back Pain     It started three days ago,  my lower back is hurting I have a headache,  and  I am  vomiting  them  my stomach started hurting.  I noticed that my urine was turning orange        34-year-old female with a history of ADHD, depression, asthma presenting to the ER today with lower abdominal pain and back pain that started yesterday.  Patient states there was no fall or injury.  She has had hot and cold flashes like she has had a fever but has not checked with a thermometer.  She states that she is also had some headache and runny nose, nausea and vomiting.  Patient states that she really has not been able to drink much fluids and her urine looked darker in color.  She denied any difficulty urinating or painful urination.  She tells me she has no concerns for pregnancy as she just had a salpingectomy 1 month ago.  She is not having any vaginal bleeding or discharge.  Patient tells me the last time she pooped was 2 days ago.  She states that she has pain throughout her lower abdomen going into her sides and into her lower back.  She denies cough, wheezing, chest pain or shortness of breath.  She has not taken any medicine for symptom relief.  She does smoke but denies any alcohol use or IV drug use.  No further complaints at this time.      History provided by:  Patient          Patient History   Past Medical History:   Diagnosis Date    ADHD (attention deficit hyperactivity disorder)     Anxiety     Depression     Family history of throat cancer 01/16/2024    Father    Migraine, unspecified, not intractable, without status migrainosus     Migraine    Personal history of other infectious and parasitic diseases     History of streptococcal infection    Personal history of other mental and behavioral disorders     History of depression    Personal history of other specified conditions     History of lump of left breast    Unspecified asthma, uncomplicated  ([facility])     Asthmatic bronchitis     Past Surgical History:   Procedure Laterality Date    MULTIPLE TOOTH EXTRACTIONS Bilateral      Family History   Problem Relation Name Age of Onset    Alcohol abuse Father Jeanjorge Harris     Cancer Father Jeanjorge Harris     Diabetes Father Jean Harris     Hypertension Maternal Grandfather Pio Lopez SR     Cancer Paternal Grandfather Dwayne Harris     Colon cancer Paternal Grandfather Dwayne Harris     Cancer Mother's Brother Pio Lopez      Social History     Tobacco Use    Smoking status: Every Day     Current packs/day: 0.50     Types: Cigarettes     Passive exposure: Current    Smokeless tobacco: Never   Vaping Use    Vaping status: Never Used   Substance Use Topics    Alcohol use: Yes     Comment: I only drink occasionally    Drug use: Not Currently     Types: Marijuana       Physical Exam   ED Triage Vitals   Temperature Heart Rate Respirations BP   03/26/25 1959 03/26/25 1959 03/26/25 1959 03/26/25 2040   36.7 °C (98.1 °F) (!) 120 19 103/65      Pulse Ox Temp Source Heart Rate Source Patient Position   03/26/25 1959 03/26/25 1959 03/26/25 1959 03/26/25 1959   97 % Temporal Monitor Sitting      BP Location FiO2 (%)     03/26/25 1959 --     Right arm        Physical Exam  HENT:      Head: Normocephalic and atraumatic.      Mouth/Throat:      Mouth: Mucous membranes are moist.      Pharynx: Oropharynx is clear. No oropharyngeal exudate or posterior oropharyngeal erythema.   Eyes:      Conjunctiva/sclera: Conjunctivae normal.   Cardiovascular:      Rate and Rhythm: Regular rhythm. Tachycardia present.      Pulses: Normal pulses.      Heart sounds: Normal heart sounds.   Pulmonary:      Effort: Pulmonary effort is normal.      Breath sounds: Normal breath sounds.   Abdominal:      General: Bowel sounds are normal. There is no distension.      Palpations: Abdomen is soft.      Tenderness: There is abdominal tenderness. There is no guarding or rebound.    Musculoskeletal:      Cervical back: Normal range of motion and neck supple. No rigidity or tenderness.      Comments: Normal gait and strength tone, no calf tenderness or swelling bilaterally.    Lymphadenopathy:      Cervical: No cervical adenopathy.   Skin:     General: Skin is warm.      Capillary Refill: Capillary refill takes less than 2 seconds.   Neurological:      General: No focal deficit present.      Mental Status: She is alert and oriented to person, place, and time.      Cranial Nerves: No cranial nerve deficit.      Sensory: No sensory deficit.      Motor: No weakness.      Coordination: Coordination normal.      Gait: Gait normal.      Comments: Speech normal. No deficits.           ED Course & MDM   Diagnoses as of 03/27/25 0119   Urinary tract infection without hematuria, site unspecified                 No data recorded     Khadra Coma Scale Score: 15 (03/26/25 2005 : Suki Larkin RN)                           Medical Decision Making  34-year-old female with a history of ADHD, depression, asthma, recent bilateral salpingectomy on 2/19 presenting to the ER today with lower abdominal pain, lower back pain with nausea and vomiting.  Symptoms started yesterday.  She has had some intermittent fevers headache and runny nose as well.  She denies sore throat cough, chest pain or shortness of breath.  She has not had any vaginal bleeding or discharge.  She states her urine looks dark/orange in color but she denies any dysuria, frequency or urgency.  Last bowel movement was 2 days ago and she denies dark or bloody stools.  No further complaints the patient arrives afebrile, tachycardic with lower blood pressure reading but otherwise stable vital signs.  She ambulates into the exam room without any difficulty or weakness.  On my exam she is tachycardic but regular, lungs are clear and there is no CVA tenderness.  She is tender through the lower lumbar region diffusely without midline tenderness or  step-offs or erythema or edema.  She is tender in the lower flanks and through the lower abdomen diffusely without guarding or rebound.  No calf tenderness or swelling bilaterally.  Laboratory studies, urinalysis and CT are ordered as well as pain nausea medication and IV fluids.    CBC with leukocytosis of 16.3, hemoglobin of 16.4.  Urinalysis today positive for leukoesterase, ketones and hematuria.  There is some epithelial squamous cells and 1+ bacteria.  Patient is negative for COVID and flu.  Lactate level is 0.6.  CMP with glucose of 106, sodium of 130.  IV fluids are ordered for the patient.  Patient did request additional pain medication which is ordered for her.  She is still pending CT at this time.    CT performed today shows ill-defined areas of heterogeneous decreased attenuation in the interpolar cortex of the bilateral kidneys with associated urothelial thickening seen with concern for UTI or pyelonephritis.  There is a punctate left renal calculus, normal appendix as well as hepatomegaly.  No evidence of obstruction.  I did order a dose of IV Rocephin for the patient and she will be reassessed.    After pain medication, antibiotics and nausea medication on repeat assessment patient is resting comfortably and she tells me that she feels much better.  She has had some dark-colored urine which I do suspect had a component of dehydration but she also has findings concerning for UTI and pyelonephritis will be treated for this home-going with antibiotics.  Patient's vital signs are stable, on chart review her blood pressures are running at baseline compared to her other visits.  I did discuss with the patient treatment plan home-going and the need for close follow-up with her doctor but I also discussed with her warning signs to return to the emergency department and she expressed understanding and agreed with the plan of care today.      Labs Reviewed   URINALYSIS WITH REFLEX CULTURE AND MICROSCOPIC -  Abnormal       Result Value    Color, Urine Yellow      Appearance, Urine Clear      Specific Gravity, Urine 1.027      pH, Urine 6.0      Protein, Urine 70 (1+) (*)     Glucose, Urine Normal      Blood, Urine 0.2 (2+) (*)     Ketones, Urine TRACE (*)     Bilirubin, Urine NEGATIVE      Urobilinogen, Urine 12 (3+) (*)     Nitrite, Urine NEGATIVE      Leukocyte Esterase, Urine 75 [place]/uL (*)    CBC WITH AUTO DIFFERENTIAL - Abnormal    WBC 16.3 (*)     nRBC 0.0      RBC 4.71      Hemoglobin 14.6      Hematocrit 42.3      MCV 90      MCH 31.0      MCHC 34.5      RDW 13.8      Platelets 303      Neutrophils % 74.4      Immature Granulocytes %, Automated 0.4      Lymphocytes % 15.1      Monocytes % 9.5      Eosinophils % 0.2      Basophils % 0.4      Neutrophils Absolute 12.10 (*)     Immature Granulocytes Absolute, Automated 0.07      Lymphocytes Absolute 2.46      Monocytes Absolute 1.54 (*)     Eosinophils Absolute 0.03      Basophils Absolute 0.07     COMPREHENSIVE METABOLIC PANEL - Abnormal    Glucose 106 (*)     Sodium 130 (*)     Potassium 3.7      Chloride 96 (*)     Bicarbonate 23      Anion Gap 15      Urea Nitrogen 12      Creatinine 1.06 (*)     eGFR 70      Calcium 9.1      Albumin 4.0      Alkaline Phosphatase 65      Total Protein 7.8      AST 12      Bilirubin, Total 0.7      ALT 14     MICROSCOPIC ONLY, URINE - Abnormal    WBC, Urine 6-10 (*)     RBC, Urine 1-2      Squamous Epithelial Cells, Urine 10-25 (FEW)      Bacteria, Urine 1+ (*)    LIPASE - Normal    Lipase 29      Narrative:     Venipuncture immediately after or during the administration of Metamizole may lead to falsely low results. Testing should be performed immediately prior to Metamizole dosing.   LACTATE - Normal    Lactate 0.6      Narrative:     Venipuncture immediately after or during the administration of Metamizole may lead to falsely low results. Testing should be performed immediately prior to Metamizole dosing.   INFLUENZA A AND B  PCR - Normal    Flu A Result Not Detected      Flu B Result Not Detected      Narrative:     This assay is an in vitro diagnostic multiplex nucleic acid amplification test for the detection and discrimination of Influenza A & B from nasopharyngeal specimens, and has been validated for use at [facility]. Negative results do not preclude Influenza A/B infections, and should not be used as the sole basis for diagnosis, treatment, or other management decisions. If Influenza A/B and RSV PCR results are negative, testing for Parainfluenza virus, Adenovirus and Metapneumovirus is routinely performed for [facility] pediatric oncology and intensive care inpatients, and is available on other patients by placing an add-on request.   SARS-COV-2 PCR - Normal    Coronavirus 2019, PCR Not Detected      Narrative:     This assay is an FDA-cleared, in vitro diagnostic nucleic acid amplification test for the qualitative detection and differentiation of SARS CoV-2 from nasopharyngeal specimens collected from individuals with signs and symptoms of respiratory tract infections, and has been validated for use at [facility]. Negative results do not preclude COVID-19 infections and should not be used as the sole basis for diagnosis, treatment, or other management decisions. Testing for SARS CoV-2 is recommended only for patients who meet current clinical and/or epidemiological criteria defined by federal, state, or local public health directives.   URINE CULTURE   URINALYSIS WITH REFLEX CULTURE AND MICROSCOPIC    Narrative:     The following orders were created for panel order Urinalysis with Reflex Culture and Microscopic.  Procedure                               Abnormality         Status                     ---------                               -----------         ------                     Urinalysis with Reflex C...[[id number]]  Abnormal            Final result               Extra Urine Granados  Tube[[id number]]                            In process                   Please view results for these tests on the individual orders.   EXTRA URINE GRAY TUBE       CT abdomen pelvis w IV contrast   Final Result   1. Ill-defined areas of heterogeneous decreased attenuation with   interpolar cortex of bilateral kidneys with associated urothelial   thickening of bilateral renal pelvis, left greater than right   concerning for focal pyelonephritis/UTI. Clinical correlation and   with urinalysis recommended.   2. Nonobstructing punctate left renal calculus.   3. Hepatomegaly but no focal liver lesions. Correlation with liver   function tests recommended.   4. Normal appendix.   5. Mild ileus type pattern. No bowel obstruction or free air.   6. Additional detailed nonacute findings as above.             Signed by: Kaveh Villarreal 3/26/2025 10:55 PM   Dictation workstation:   [id number]            Procedure  Procedures     Lisa Armas PA-C  03/27/25 0121

## 2025-03-28 RX ORDER — DEXTROAMPHETAMINE SACCHARATE, AMPHETAMINE ASPARTATE MONOHYDRATE, DEXTROAMPHETAMINE SULFATE AND AMPHETAMINE SULFATE 5; 5; 5; 5 MG/1; MG/1; MG/1; MG/1
20 CAPSULE, EXTENDED RELEASE ORAL EVERY MORNING
Qty: 30 CAPSULE | Refills: 0 | OUTPATIENT
Start: 2025-05-26 | End: 2025-06-25

## 2025-03-28 RX ORDER — DEXTROAMPHETAMINE SACCHARATE, AMPHETAMINE ASPARTATE MONOHYDRATE, DEXTROAMPHETAMINE SULFATE AND AMPHETAMINE SULFATE 5; 5; 5; 5 MG/1; MG/1; MG/1; MG/1
20 CAPSULE, EXTENDED RELEASE ORAL EVERY MORNING
Qty: 30 CAPSULE | Refills: 0 | Status: SHIPPED | OUTPATIENT
Start: 2025-03-28 | End: 2025-04-27

## 2025-03-28 RX ORDER — DEXTROAMPHETAMINE SACCHARATE, AMPHETAMINE ASPARTATE MONOHYDRATE, DEXTROAMPHETAMINE SULFATE AND AMPHETAMINE SULFATE 5; 5; 5; 5 MG/1; MG/1; MG/1; MG/1
20 CAPSULE, EXTENDED RELEASE ORAL EVERY MORNING
Qty: 30 CAPSULE | Refills: 0 | OUTPATIENT
Start: 2025-04-26 | End: 2025-05-26

## 2025-03-29 ENCOUNTER — TELEPHONE (OUTPATIENT)
Dept: PRIMARY CARE | Facility: CLINIC | Age: 35
End: 2025-03-29
Payer: COMMERCIAL

## 2025-03-29 NOTE — TELEPHONE ENCOUNTER
Patient of Dr. Davey    Patient  called on sat and stated she was sent the wrong prescription for adderall.     The adderall xr 20mg once a day was sent to pharmacy and she picked it up. She noticed it was the wrong prescription when she got home. The XR she has a hard time going to sleep. She is going to call pharmacy and see what she should do    She needs a prescription for adderall 20mg tablets twice daily. She does have an appointment with DR. Davey on Monday.    [facility] [address] [place] [address] Phone: [phone]   Fax: [phone]

## 2025-03-31 ENCOUNTER — APPOINTMENT (OUTPATIENT)
Dept: PRIMARY CARE | Facility: CLINIC | Age: 35
End: 2025-03-31
Payer: COMMERCIAL

## 2025-03-31 VITALS
WEIGHT: 186 LBS | SYSTOLIC BLOOD PRESSURE: 100 MMHG | DIASTOLIC BLOOD PRESSURE: 60 MMHG | BODY MASS INDEX: 32.95 KG/M2 | OXYGEN SATURATION: 98 % | TEMPERATURE: 97 F | HEART RATE: 77 BPM

## 2025-03-31 DIAGNOSIS — Z72.0 TOBACCO ABUSE: ICD-10-CM

## 2025-03-31 DIAGNOSIS — Z79.899 MEDICATION MANAGEMENT: ICD-10-CM

## 2025-03-31 DIAGNOSIS — F90.2 ATTENTION DEFICIT HYPERACTIVITY DISORDER (ADHD), COMBINED TYPE: Primary | ICD-10-CM

## 2025-03-31 PROCEDURE — 99214 OFFICE O/P EST MOD 30 MIN: CPT | Performed by: FAMILY MEDICINE

## 2025-03-31 RX ORDER — DEXTROAMPHETAMINE SACCHARATE, AMPHETAMINE ASPARTATE, DEXTROAMPHETAMINE SULFATE AND AMPHETAMINE SULFATE 5; 5; 5; 5 MG/1; MG/1; MG/1; MG/1
20 TABLET ORAL 2 TIMES DAILY
Qty: 60 TABLET | Refills: 0 | Status: SHIPPED | OUTPATIENT
Start: 2025-03-31

## 2025-03-31 ASSESSMENT — ENCOUNTER SYMPTOMS
SHORTNESS OF BREATH: 0
FLANK PAIN: 1
PHOTOPHOBIA: 0
STRIDOR: 0
ABDOMINAL PAIN: 0
SINUS PRESSURE: 0
COUGH: 0
POLYDIPSIA: 0
NECK STIFFNESS: 0
CONSTITUTIONAL NEGATIVE: 1
FREQUENCY: 1
SLEEP DISTURBANCE: 0
TROUBLE SWALLOWING: 0
ACTIVITY CHANGE: 0
DIZZINESS: 0
RECTAL PAIN: 0
EYE PAIN: 0
HEADACHES: 0
FATIGUE: 0
HEMATURIA: 0
COLOR CHANGE: 0
AGITATION: 0
DYSURIA: 0
SPEECH DIFFICULTY: 0
MYALGIAS: 0
ARTHRALGIAS: 0
APPETITE CHANGE: 0
DYSPHORIC MOOD: 0
DECREASED CONCENTRATION: 1
POLYPHAGIA: 0
SORE THROAT: 0
CONFUSION: 0
DIARRHEA: 0
RHINORRHEA: 0
PALPITATIONS: 0
ADENOPATHY: 0
NERVOUS/ANXIOUS: 0
CONSTIPATION: 0
CHEST TIGHTNESS: 0
SINUS PAIN: 0
DECREASED CONCENTRATION: 0
SEIZURES: 0
ABDOMINAL DISTENTION: 0
FLANK PAIN: 0
BLOOD IN STOOL: 0
FEVER: 0

## 2025-03-31 NOTE — PROGRESS NOTES
Subjective   Patient ID: Helene Harris is a 34 y.o. female who presents for ADHD and Results.    HPI     Review of Systems   Constitutional: Negative.  Negative for activity change, appetite change, fatigue and fever.   HENT:  Negative for congestion, dental problem, ear discharge, ear pain, mouth sores, rhinorrhea, sinus pressure, sinus pain, sore throat, tinnitus and trouble swallowing.    Eyes:  Negative for photophobia, pain and visual disturbance.   Respiratory:  Negative for cough, chest tightness, shortness of breath and stridor.    Cardiovascular:  Negative for chest pain and palpitations.   Gastrointestinal:  Negative for abdominal distention, abdominal pain, blood in stool, constipation, diarrhea and rectal pain.   Endocrine: Negative for cold intolerance, heat intolerance, polydipsia, polyphagia and polyuria.   Genitourinary:  Positive for flank pain, frequency and pelvic pain. Negative for dysuria, hematuria and urgency.   Musculoskeletal:  Negative for arthralgias, gait problem, myalgias and neck stiffness.   Skin:  Negative for color change and rash.   Allergic/Immunologic: Negative for environmental allergies and food allergies.   Neurological:  Negative for dizziness, seizures, syncope, speech difficulty and headaches.   Hematological:  Negative for adenopathy.   Psychiatric/Behavioral:  Negative for agitation, confusion, decreased concentration, dysphoric mood and sleep disturbance. The patient is not nervous/anxious.        Objective   LMP 02/25/2024 (Approximate)     Physical Exam    Assessment/Plan   {Assess/PlanSmartLinks:67073}

## 2025-03-31 NOTE — PROGRESS NOTES
Subjective   Patient ID: Helene Harris is a 34 y.o. female who presents for ADHD (Patient is doing well on current medication) and Results (Patient went to the ER last week a kidney infection and dehydration. Would like to go over results.).  History of Present Illness  Helene Harris is a 34 year old female who presents with medication management issues related to Adderall and a recent kidney infection.    She is experiencing issues with her Adderall prescription, having been mistakenly given extended-release Adderall instead of her usual immediate-release formulation. She normally takes Adderall 20 mg IR twice a day. The extended-release formulation makes it difficult for her to sleep as it 'lasts literally all day and all night.' She has picked up the medication but has not used it and plans to return it.    She recently visited the emergency room due to a kidney infection. She experienced pain in her kidneys, which has since resolved with the use of Keflex. She describes her urine as 'peeing orange' and had pelvic pain but no burning sensation during urination. Her symptoms have improved by about 50%.    Recent lab work from the hospital showed high glucose levels and low sodium levels. Her white blood cell count was elevated, and her monocyte absolute was slightly high, indicating a possible viral infection. Kidney function tests, including BUN and creatinine, were within normal limits. She was negative for COVID-19 and flu A and B. No headaches, dizziness, vision changes, insomnia, or appetite changes.    Review of Systems   Constitutional: Negative.  Negative for activity change, appetite change, fatigue and fever.   HENT:  Negative for congestion, dental problem, ear discharge, ear pain, mouth sores, rhinorrhea, sinus pressure, sinus pain, sore throat, tinnitus and trouble swallowing.    Eyes:  Negative for photophobia, pain and visual disturbance.   Respiratory:  Negative for cough, chest tightness,  shortness of breath and stridor.    Cardiovascular:  Negative for chest pain and palpitations.   Gastrointestinal:  Negative for abdominal distention, abdominal pain, blood in stool, constipation, diarrhea and rectal pain.   Endocrine: Negative for cold intolerance, heat intolerance, polydipsia, polyphagia and polyuria.   Genitourinary:  Negative for dysuria, flank pain, hematuria and urgency.   Musculoskeletal:  Negative for arthralgias, gait problem, myalgias and neck stiffness.   Skin:  Negative for color change and rash.   Allergic/Immunologic: Negative for environmental allergies and food allergies.   Neurological:  Negative for dizziness, seizures, syncope, speech difficulty and headaches.   Hematological:  Negative for adenopathy.   Psychiatric/Behavioral:  Positive for decreased concentration. Negative for agitation, confusion, dysphoric mood and sleep disturbance. The patient is not nervous/anxious.        Objective     /60 (BP Location: Right arm, Patient Position: Sitting, BP Cuff Size: Adult)   Pulse 77   Temp 36.1 °C (97 °F) (Temporal)   Wt 84.4 kg (186 lb)   LMP 02/25/2024 (Approximate)   SpO2 98%   BMI 32.95 kg/m²      Physical Exam  GENERAL: Alert, cooperative, well developed, no acute distress, normal exam findings.  HEENT: Normocephalic, normal oropharynx, moist mucous membranes.  CHEST: Clear to auscultation bilaterally, no wheezes, rhonchi, or crackles.  CARDIOVASCULAR: Normal heart rate and rhythm, S1 and S2 normal without murmurs.  ABDOMEN: Soft, non-tender, non-distended, without organomegaly, normal bowel sounds.  EXTREMITIES: No cyanosis or edema.  NEUROLOGICAL: Cranial nerves grossly intact, moves all extremities without gross motor or sensory deficit.    Assessment & Plan  Kidney Infection  Recently treated in the emergency room for a kidney infection. Prescribed Keflex, which has alleviated flank pain and improved symptoms by approximately 50%. Reported dehydration,  potentially contributing to symptoms. Lab results showed elevated white blood cell count and low sodium levels, indicating infection and possible dehydration.  - Continue Keflex as prescribed  - Encourage adequate hydration  - Monitor white blood cell count and sodium levels    Hyponatremia  Recent lab results showed low sodium levels (130 mmol/L) during emergency room visit. Dietary sodium intake was discussed.  - Monitor sodium levels  - Encourage dietary sodium intake    Leukocytosis  White blood cell count elevated at 16.3 x 10^9/L, indicating possible infection during recent emergency room visit.  - Monitor white blood cell count    Attention Deficit Hyperactivity Disorder (ADHD)  On Adderall IR 20 mg twice daily for ADHD management. Recently received Adderall XR due to prescription error, causing difficulty sleeping. Prefers immediate release formulation as it does not interfere with sleep.  - Ensure prescription for Adderall IR 20 mg twice daily is sent to pharmacy  - Collect Adderall XR for return    Results  LABS  Glucose: 106 (03/24/2025)  Sodium: 130 (03/24/2025)  COVID-19: Negative (03/24/2025)  Influenza A: Negative (03/24/2025)  Influenza B: Negative (03/24/2025)  Lipase: 29 (03/24/2025)  BUN: 12 (03/24/2025)  Creatinine: 1.02 (03/24/2025)  GFR: 70 (03/24/2025)  WBC: 16.3 (03/24/2025)  Hemoglobin: 14.3 (03/24/2025)  Hematocrit: 42.3 (03/24/2025)  Monocyte Absolute: High (03/24/2025)  Leukocytes: 75 (03/24/2025)       Chris Davey DO     This medical note was created with the assistance of artificial intelligence (AI) for documentation purposes. The content has been reviewed and confirmed by the healthcare provider for accuracy and completeness. Patient consented to the use of audio recording and use of AI during their visit.

## 2025-03-31 NOTE — PATIENT INSTRUCTIONS
VISIT SUMMARY:  During your visit, we discussed your recent kidney infection, issues with your Adderall prescription, and reviewed your lab results from the emergency room. We addressed your concerns and made adjustments to your treatment plan to better manage your conditions.    YOUR PLAN:  -KIDNEY INFECTION: A kidney infection is a type of urinary tract infection that generally begins in your urethra or bladder and travels to one or both of your kidneys. You should continue taking Keflex as prescribed, ensure you stay well-hydrated, and we will monitor your white blood cell count and sodium levels to ensure the infection is resolving.    -HYPONATREMIA: Hyponatremia means you have low sodium levels in your blood. We will monitor your sodium levels and encourage you to increase your dietary sodium intake.    -LEUKOCYTOSIS: Leukocytosis is an increase in the number of white blood cells, often due to an infection. We will continue to monitor your white blood cell count to ensure it returns to normal.    -ATTENTION DEFICIT HYPERACTIVITY DISORDER (ADHD): ADHD is a condition characterized by symptoms such as inattentiveness, hyperactivity, and impulsiveness. We will ensure your prescription for Adderall IR 20 mg twice daily is sent to the pharmacy, and you should return the Adderall XR that was mistakenly given to you.    INSTRUCTIONS:  Please follow up with us to monitor your white blood cell count and sodium levels. Ensure you stay hydrated and increase your dietary sodium intake. Return the Adderall XR to the pharmacy and continue with the Adderall IR as prescribed.     Follow up in 3 months    Continue current medications and therapy for chronic medical conditions.    Patient was advised importance of proper diet/nutrition in addition adequate hydration. Patient was encouraged moderate exercise program to include 30 minutes daily for 5 days of the week or 150 minutes weekly. Patient will follow-up with us as  scheduled.

## 2025-04-29 DIAGNOSIS — Z79.899 MEDICATION MANAGEMENT: ICD-10-CM

## 2025-04-29 NOTE — TELEPHONE ENCOUNTER
[id number] - [place], [place] - [address]   Pt needs refill on  amphetamine-dextroamphetamine (Adderall) 20 mg tablet

## 2025-04-30 RX ORDER — DEXTROAMPHETAMINE SACCHARATE, AMPHETAMINE ASPARTATE, DEXTROAMPHETAMINE SULFATE AND AMPHETAMINE SULFATE 5; 5; 5; 5 MG/1; MG/1; MG/1; MG/1
20 TABLET ORAL 2 TIMES DAILY
Qty: 60 TABLET | Refills: 0 | Status: SHIPPED | OUTPATIENT
Start: 2025-04-30 | End: 2025-05-30

## 2025-05-29 DIAGNOSIS — Z79.899 MEDICATION MANAGEMENT: ICD-10-CM

## 2025-05-29 NOTE — TELEPHONE ENCOUNTER
Recent Visits  Date Type Provider Dept   03/31/25 Office Visit Chris Davey DO [facility]   01/28/25 Office Visit Chris Davey, DO [facility]   Showing recent visits within past 180 days and meeting all other requirements  Future Appointments  No visits were found meeting these conditions.  Showing future appointments within next 90 days and meeting all other requirements

## 2025-05-31 RX ORDER — DEXTROAMPHETAMINE SACCHARATE, AMPHETAMINE ASPARTATE, DEXTROAMPHETAMINE SULFATE AND AMPHETAMINE SULFATE 5; 5; 5; 5 MG/1; MG/1; MG/1; MG/1
20 TABLET ORAL 2 TIMES DAILY
Qty: 60 TABLET | Refills: 0 | OUTPATIENT
Start: 2025-05-31 | End: 2025-06-30

## 2025-06-23 ENCOUNTER — APPOINTMENT (OUTPATIENT)
Dept: PRIMARY CARE | Facility: CLINIC | Age: 35
End: 2025-06-23
Payer: COMMERCIAL

## 2025-07-10 ENCOUNTER — APPOINTMENT (OUTPATIENT)
Dept: PRIMARY CARE | Facility: CLINIC | Age: 35
End: 2025-07-10
Payer: COMMERCIAL

## 2025-07-10 DIAGNOSIS — F90.2 ATTENTION DEFICIT HYPERACTIVITY DISORDER (ADHD), COMBINED TYPE: Primary | ICD-10-CM

## 2025-07-10 DIAGNOSIS — Z72.0 TOBACCO ABUSE: ICD-10-CM

## 2025-07-10 DIAGNOSIS — Z79.899 MEDICATION MANAGEMENT: ICD-10-CM

## 2025-07-10 DIAGNOSIS — E87.1 HYPONATREMIA: ICD-10-CM

## 2025-07-10 DIAGNOSIS — F60.3 BORDERLINE PERSONALITY DISORDER (MULTI): ICD-10-CM

## 2025-07-10 PROCEDURE — 99213 OFFICE O/P EST LOW 20 MIN: CPT | Performed by: FAMILY MEDICINE

## 2025-07-10 NOTE — PROGRESS NOTES
Subjective   Patient ID: Helene Harris is a 34 y.o. female who presents for No chief complaint on file..      Virtual or Telephone Consent    An interactive audio and video telecommunication system which permits real time communications between the patient (at the originating site) and provider (at the distant site) was utilized to provide this telehealth service.   Verbal consent was requested and obtained from Helene Harris on this date, 07/10/25 for a telehealth visit and the patient's location was confirmed at the time of the visit.     History of Present Illness  The patient is a 34-year-old  female who presents for follow-up on depression and a recent ER visit.    She experiences occasional dizziness. She does not take any diuretics or over-the-counter medications, except for ibuprofen which she uses to manage her migraines. She reports no instances of vomiting or diarrhea. Her diet includes a high intake of salt.    Review of Systems   Constitutional: Negative.  Negative for activity change, appetite change, fatigue and fever.   HENT:  Negative for congestion, dental problem, ear discharge, ear pain, mouth sores, rhinorrhea, sinus pressure, sinus pain, sore throat, tinnitus and trouble swallowing.    Eyes:  Negative for photophobia, pain and visual disturbance.   Respiratory:  Negative for cough, chest tightness, shortness of breath and stridor.    Cardiovascular:  Negative for chest pain and palpitations.   Gastrointestinal:  Negative for abdominal distention, abdominal pain, blood in stool, constipation, diarrhea and rectal pain.   Endocrine: Negative for cold intolerance, heat intolerance, polydipsia, polyphagia and polyuria.   Genitourinary:  Negative for dysuria, flank pain, hematuria and urgency.   Musculoskeletal:  Negative for arthralgias, gait problem, myalgias and neck stiffness.   Skin:  Negative for color change and rash.   Allergic/Immunologic: Negative for environmental  allergies and food allergies.   Neurological:  Negative for dizziness, seizures, syncope, speech difficulty and headaches.   Hematological:  Negative for adenopathy.   Psychiatric/Behavioral:  Positive for decreased concentration. Negative for agitation, confusion, dysphoric mood and sleep disturbance. The patient is nervous/anxious.        Objective     There were no vitals taken for this visit.     Physical Exam  Alert, Oriented x 3, affect pleasant    Problem List Items Addressed This Visit    None       Assessment & Plan  1. Hyponatremia.  - Sodium level was noted to be low at 130.  - Reports occasional dizziness but no vomiting or diarrhea.  - Advised to consume salty foods such as crackers or pretzels.  - A non-fasting chemistry panel will be repeated at the end of summer to monitor sodium levels.    2. Depression.  - Follow-up on depression and recent ER visit.  - No specific symptoms or progression discussed.  - No new physical exam findings or test results mentioned.  - No changes in medication or therapy discussed.    3. Attention Deficit Hyperactivity Disorder (ADHD).  - No specific symptoms or progression discussed.  - No new physical exam findings or test results mentioned.  - Adderall 20 mg immediate release twice a day prescribed.  - No additional management or referrals discussed.    Chris Davey DO     This medical note was created with the assistance of artificial intelligence (AI) for documentation purposes. The content has been reviewed and confirmed by the healthcare provider for accuracy and completeness. Patient consented to the use of audio recording and use of AI during their visit.

## 2025-07-11 RX ORDER — DEXTROAMPHETAMINE SACCHARATE, AMPHETAMINE ASPARTATE, DEXTROAMPHETAMINE SULFATE AND AMPHETAMINE SULFATE 5; 5; 5; 5 MG/1; MG/1; MG/1; MG/1
20 TABLET ORAL 2 TIMES DAILY
Qty: 60 TABLET | Refills: 0 | Status: SHIPPED | OUTPATIENT
Start: 2025-07-11 | End: 2025-08-10

## 2025-07-11 RX ORDER — DEXTROAMPHETAMINE SACCHARATE, AMPHETAMINE ASPARTATE, DEXTROAMPHETAMINE SULFATE AND AMPHETAMINE SULFATE 5; 5; 5; 5 MG/1; MG/1; MG/1; MG/1
20 TABLET ORAL 2 TIMES DAILY
Qty: 60 TABLET | Refills: 0 | Status: SHIPPED | OUTPATIENT
Start: 2025-09-10 | End: 2025-10-10

## 2025-07-11 RX ORDER — DEXTROAMPHETAMINE SACCHARATE, AMPHETAMINE ASPARTATE, DEXTROAMPHETAMINE SULFATE AND AMPHETAMINE SULFATE 5; 5; 5; 5 MG/1; MG/1; MG/1; MG/1
20 TABLET ORAL 2 TIMES DAILY
Qty: 60 TABLET | Refills: 0 | Status: SHIPPED | OUTPATIENT
Start: 2025-08-11 | End: 2025-09-10

## 2025-07-11 ASSESSMENT — ENCOUNTER SYMPTOMS
BLOOD IN STOOL: 0
PHOTOPHOBIA: 0
RECTAL PAIN: 0
FATIGUE: 0
DYSURIA: 0
POLYDIPSIA: 0
CHEST TIGHTNESS: 0
HEMATURIA: 0
SEIZURES: 0
ABDOMINAL DISTENTION: 0
SHORTNESS OF BREATH: 0
TROUBLE SWALLOWING: 0
STRIDOR: 0
ACTIVITY CHANGE: 0
DYSPHORIC MOOD: 0
PALPITATIONS: 0
DIZZINESS: 0
FEVER: 0
AGITATION: 0
CONSTIPATION: 0
DIARRHEA: 0
COLOR CHANGE: 0
ABDOMINAL PAIN: 0
RHINORRHEA: 0
CONFUSION: 0
DECREASED CONCENTRATION: 1
CONSTITUTIONAL NEGATIVE: 1
MYALGIAS: 0
SINUS PAIN: 0
POLYPHAGIA: 0
SPEECH DIFFICULTY: 0
COUGH: 0
ARTHRALGIAS: 0
EYE PAIN: 0
SINUS PRESSURE: 0
NECK STIFFNESS: 0
SORE THROAT: 0
ADENOPATHY: 0
FLANK PAIN: 0
NERVOUS/ANXIOUS: 1
HEADACHES: 0
SLEEP DISTURBANCE: 0
APPETITE CHANGE: 0

## (undated) DEVICE — PACK,SET UP,DRAPE: Brand: MEDLINE

## (undated) DEVICE — CATHETER URIN INTMIT SELF CATH STR TIP 14 FR

## (undated) DEVICE — PAD,NON-ADHERENT,3X8,STERILE,LF,1/PK: Brand: MEDLINE

## (undated) DEVICE — ELECTRODE ES L W2CM D0.8CM SHFT L12CM CONN L3/32IN TUNGSTEN

## (undated) DEVICE — LUBRICANT SURG JELLY ST BACTER TUBE 4.25OZ

## (undated) DEVICE — GLOVE SURG L12IN SZ 65FNGR THK94MIL TRNSLUC YEL LTX

## (undated) DEVICE — TRAY PREP DRY W/ PREM GLV 2 APPL 6 SPNG 2 UNDPD 1 OVERWRAP

## (undated) DEVICE — ELECTRODE ES SHFT L12CM D5MM TUNGSTEN BALL DISP FOR LEEP

## (undated) DEVICE — MEDI-VAC NON-CONDUCTIVE SUCTION TUBING: Brand: CARDINAL HEALTH

## (undated) DEVICE — PENCIL ES L3M BTTN SWCH HOLSTER W/ BLDE ELECTRD EDGE

## (undated) DEVICE — GOWN,AURORA,NONREINFORCED,LARGE: Brand: MEDLINE

## (undated) DEVICE — NEEDLE SPNL 22GA L3.5IN BLK HUB S STL REG WALL FIT STYL W/

## (undated) DEVICE — SYRINGE MED 10ML LUERLOCK TIP W/O SFTY DISP

## (undated) DEVICE — LABEL MED MINI W/ MARKER

## (undated) DEVICE — TUBING SMK EVAC L3M DIA2.2CM SPNG GRD PREFLTR ADPT FLTR

## (undated) DEVICE — SWAB PROCTO 16IN

## (undated) DEVICE — 2000CC GUARDIAN II: Brand: GUARDIAN

## (undated) DEVICE — ELECTRODE PT RET AD L9FT HI MOIST COND ADH HYDRGEL CORDED

## (undated) DEVICE — X-RAY DETECTABLE SPONGES,16 PLY: Brand: VISTEC

## (undated) DEVICE — LITHOTOMY DRAPE WITH FLUID CONTROL POUCH: Brand: CONVERTORS

## (undated) DEVICE — Z DISCONTINUED NO SUB PREFILTER SMK EVAC W/ CAP DISP

## (undated) DEVICE — TOWEL,OR,DSP,ST,BLUE,STD,4/PK,20PK/CS: Brand: MEDLINE

## (undated) DEVICE — MEDI-VAC YANKAUER SUCTION HANDLE W/BULBOUS TIP: Brand: CARDINAL HEALTH